# Patient Record
Sex: FEMALE | Race: WHITE | NOT HISPANIC OR LATINO | URBAN - METROPOLITAN AREA
[De-identification: names, ages, dates, MRNs, and addresses within clinical notes are randomized per-mention and may not be internally consistent; named-entity substitution may affect disease eponyms.]

---

## 2018-08-16 ENCOUNTER — INPATIENT (INPATIENT)
Facility: HOSPITAL | Age: 53
LOS: 0 days | Discharge: HOME | DRG: 301 | End: 2018-08-17
Attending: STUDENT IN AN ORGANIZED HEALTH CARE EDUCATION/TRAINING PROGRAM | Admitting: STUDENT IN AN ORGANIZED HEALTH CARE EDUCATION/TRAINING PROGRAM
Payer: COMMERCIAL

## 2018-08-16 ENCOUNTER — TRANSCRIPTION ENCOUNTER (OUTPATIENT)
Age: 53
End: 2018-08-16

## 2018-08-16 VITALS — TEMPERATURE: 98 F

## 2018-08-16 DIAGNOSIS — Z90.710 ACQUIRED ABSENCE OF BOTH CERVIX AND UTERUS: Chronic | ICD-10-CM

## 2018-08-16 DIAGNOSIS — Z90.49 ACQUIRED ABSENCE OF OTHER SPECIFIED PARTS OF DIGESTIVE TRACT: Chronic | ICD-10-CM

## 2018-08-16 DIAGNOSIS — Z98.891 HISTORY OF UTERINE SCAR FROM PREVIOUS SURGERY: Chronic | ICD-10-CM

## 2018-08-16 LAB
ANION GAP SERPL CALC-SCNC: 14 MMOL/L — SIGNIFICANT CHANGE UP (ref 5–17)
APTT BLD: 62.6 SEC — HIGH (ref 27.5–37.4)
BLD GP AB SCN SERPL QL: NEGATIVE — SIGNIFICANT CHANGE UP
BUN SERPL-MCNC: 15 MG/DL — SIGNIFICANT CHANGE UP (ref 7–23)
CALCIUM SERPL-MCNC: 9.8 MG/DL — SIGNIFICANT CHANGE UP (ref 8.4–10.5)
CHLORIDE SERPL-SCNC: 100 MMOL/L — SIGNIFICANT CHANGE UP (ref 96–108)
CO2 SERPL-SCNC: 25 MMOL/L — SIGNIFICANT CHANGE UP (ref 22–31)
CREAT SERPL-MCNC: 0.7 MG/DL — SIGNIFICANT CHANGE UP (ref 0.5–1.3)
GLUCOSE SERPL-MCNC: 95 MG/DL — SIGNIFICANT CHANGE UP (ref 70–99)
HCT VFR BLD CALC: 40.6 % — SIGNIFICANT CHANGE UP (ref 34.5–45)
HGB BLD-MCNC: 13.4 G/DL — SIGNIFICANT CHANGE UP (ref 11.5–15.5)
INR BLD: 1.04 — SIGNIFICANT CHANGE UP (ref 0.88–1.16)
MAGNESIUM SERPL-MCNC: 2 MG/DL — SIGNIFICANT CHANGE UP (ref 1.6–2.6)
MCHC RBC-ENTMCNC: 30.7 PG — SIGNIFICANT CHANGE UP (ref 27–34)
MCHC RBC-ENTMCNC: 33 G/DL — SIGNIFICANT CHANGE UP (ref 32–36)
MCV RBC AUTO: 92.9 FL — SIGNIFICANT CHANGE UP (ref 80–100)
PHOSPHATE SERPL-MCNC: 3.2 MG/DL — SIGNIFICANT CHANGE UP (ref 2.5–4.5)
PLATELET # BLD AUTO: 288 K/UL — SIGNIFICANT CHANGE UP (ref 150–400)
POTASSIUM SERPL-MCNC: 3.8 MMOL/L — SIGNIFICANT CHANGE UP (ref 3.5–5.3)
POTASSIUM SERPL-SCNC: 3.8 MMOL/L — SIGNIFICANT CHANGE UP (ref 3.5–5.3)
PROTHROM AB SERPL-ACNC: 11.5 SEC — SIGNIFICANT CHANGE UP (ref 9.8–12.7)
RBC # BLD: 4.37 M/UL — SIGNIFICANT CHANGE UP (ref 3.8–5.2)
RBC # FLD: 12 % — SIGNIFICANT CHANGE UP (ref 10.3–16.9)
RH IG SCN BLD-IMP: POSITIVE — SIGNIFICANT CHANGE UP
SODIUM SERPL-SCNC: 139 MMOL/L — SIGNIFICANT CHANGE UP (ref 135–145)
WBC # BLD: 6.4 K/UL — SIGNIFICANT CHANGE UP (ref 3.8–10.5)
WBC # FLD AUTO: 6.4 K/UL — SIGNIFICANT CHANGE UP (ref 3.8–10.5)

## 2018-08-16 PROCEDURE — 70498 CT ANGIOGRAPHY NECK: CPT | Mod: 26

## 2018-08-16 PROCEDURE — 70496 CT ANGIOGRAPHY HEAD: CPT | Mod: 26

## 2018-08-16 RX ORDER — BUPROPION HYDROCHLORIDE 150 MG/1
300 TABLET, EXTENDED RELEASE ORAL
Qty: 0 | Refills: 0 | COMMUNITY

## 2018-08-16 RX ORDER — ACETAMINOPHEN 500 MG
1000 TABLET ORAL EVERY 6 HOURS
Qty: 0 | Refills: 0 | Status: DISCONTINUED | OUTPATIENT
Start: 2018-08-16 | End: 2018-08-17

## 2018-08-16 RX ORDER — DESVENLAFAXINE 50 MG/1
1 TABLET, EXTENDED RELEASE ORAL
Qty: 0 | Refills: 0 | COMMUNITY

## 2018-08-16 RX ORDER — DESVENLAFAXINE 50 MG/1
100 TABLET, EXTENDED RELEASE ORAL DAILY
Qty: 0 | Refills: 0 | Status: DISCONTINUED | OUTPATIENT
Start: 2018-08-16 | End: 2018-08-17

## 2018-08-16 RX ORDER — LIOTHYRONINE SODIUM 25 UG/1
5 TABLET ORAL
Qty: 0 | Refills: 0 | COMMUNITY

## 2018-08-16 RX ORDER — SODIUM CHLORIDE 9 MG/ML
1000 INJECTION INTRAMUSCULAR; INTRAVENOUS; SUBCUTANEOUS
Qty: 0 | Refills: 0 | Status: DISCONTINUED | OUTPATIENT
Start: 2018-08-16 | End: 2018-08-17

## 2018-08-16 RX ORDER — LIOTHYRONINE SODIUM 25 UG/1
5 TABLET ORAL DAILY
Qty: 0 | Refills: 0 | Status: DISCONTINUED | OUTPATIENT
Start: 2018-08-17 | End: 2018-08-17

## 2018-08-16 RX ORDER — TRIAMTERENE/HYDROCHLOROTHIAZID 75 MG-50MG
1 TABLET ORAL
Qty: 0 | Refills: 0 | COMMUNITY

## 2018-08-16 RX ORDER — LISINOPRIL 2.5 MG/1
1 TABLET ORAL
Qty: 0 | Refills: 0 | COMMUNITY

## 2018-08-16 RX ORDER — LEVOTHYROXINE SODIUM 125 MCG
100 TABLET ORAL DAILY
Qty: 0 | Refills: 0 | Status: DISCONTINUED | OUTPATIENT
Start: 2018-08-17 | End: 2018-08-17

## 2018-08-16 RX ORDER — ACETAMINOPHEN 500 MG
325 TABLET ORAL EVERY 4 HOURS
Qty: 0 | Refills: 0 | Status: DISCONTINUED | OUTPATIENT
Start: 2018-08-16 | End: 2018-08-16

## 2018-08-16 RX ORDER — TRIAMTERENE/HYDROCHLOROTHIAZID 75 MG-50MG
1 TABLET ORAL DAILY
Qty: 0 | Refills: 0 | Status: DISCONTINUED | OUTPATIENT
Start: 2018-08-17 | End: 2018-08-17

## 2018-08-16 RX ORDER — BUPROPION HYDROCHLORIDE 150 MG/1
300 TABLET, EXTENDED RELEASE ORAL DAILY
Qty: 0 | Refills: 0 | Status: DISCONTINUED | OUTPATIENT
Start: 2018-08-17 | End: 2018-08-17

## 2018-08-16 RX ORDER — HEPARIN SODIUM 5000 [USP'U]/ML
500 INJECTION INTRAVENOUS; SUBCUTANEOUS
Qty: 25000 | Refills: 0 | Status: DISCONTINUED | OUTPATIENT
Start: 2018-08-16 | End: 2018-08-17

## 2018-08-16 RX ORDER — LEVOTHYROXINE SODIUM 125 MCG
1 TABLET ORAL
Qty: 0 | Refills: 0 | COMMUNITY

## 2018-08-16 RX ADMIN — HEPARIN SODIUM 5 UNIT(S)/HR: 5000 INJECTION INTRAVENOUS; SUBCUTANEOUS at 20:52

## 2018-08-16 RX ADMIN — Medication 1000 MILLIGRAM(S): at 22:58

## 2018-08-16 NOTE — H&P ADULT - ASSESSMENT
53 yo F with right carotid artery dissection  -Admit to telemetry  -Heparin gtt with goal PTT 60-80  -STAT CTA head and neck  -Home meds  -NPO p mn/IVF  -AM labs      Patient seen and examined at bedside with attending Dr. Oliveros.

## 2018-08-16 NOTE — H&P ADULT - HISTORY OF PRESENT ILLNESS
53 yo F with PMH of Meniere's disease (diagnosed 1 year ago) with chronic neck pain and headaches presents as a trasnfer from [facility] with right carotid dissection. Patient states she woke up on 8/15/18 with pressure like headache that was consistent with prior regular headaches she has been having for the past year since she got diagnosed with Meniere's disease. She took Motrin which relieved her symptoms. Later on that day at work (works as a nurse at [employer]) she took her BP which was elevated to "140-150's systolic) which patient states was above her baseline. After work she was driving home to [place] and developed left arm numbness/tingling along with "heaviness", without loss of function. At that time she presented to [facility] ER. Her arm symptoms remained for 4 hours and then resolved spontaneously. On workup an MRI was performed and showed dissection of cervical segment of the right carotid artery. Since patient has her doctors at [employer] she requested and was transferred to [employer].  Denies any vision changes, amaurosis fugax, leg numbness/tingling/weakness, denies previous episodes of upper or lower extremity numbness/tingling/weakness.

## 2018-08-16 NOTE — PATIENT PROFILE ADULT. - NS TRANSFER PATIENT BELONGINGS
Clothing/Cell Phone/PDA (specify) ipad, headphones, , pocketbook/Clothing/Other belongings/Electronic Device (specify)/Cell Phone/PDA (specify)

## 2018-08-16 NOTE — H&P ADULT - NSHPPHYSICALEXAM_GEN_ALL_CORE
Gen: AAOx3, NAD  Neuro: face symmetric, equal strength bilat 5/5 of all ext, no motor/sensory deficits  Pulm: no respiratory distress  CV: RRR S1 S2  Abd: soft, non tender, non distended  LE: warm, well perfused, no edema

## 2018-08-16 NOTE — H&P ADULT - PMH
Carotid artery dissection  RIGHT  Depressive disorder  Depression  Diverticulitis of colon  Diverticulitis  Hypertension    Hypothyroidism  Hypothyroidism  Menieres disease

## 2018-08-17 ENCOUNTER — TRANSCRIPTION ENCOUNTER (OUTPATIENT)
Age: 53
End: 2018-08-17

## 2018-08-17 VITALS
DIASTOLIC BLOOD PRESSURE: 65 MMHG | HEART RATE: 68 BPM | SYSTOLIC BLOOD PRESSURE: 118 MMHG | RESPIRATION RATE: 16 BRPM | OXYGEN SATURATION: 95 %

## 2018-08-17 DIAGNOSIS — I82.409 ACUTE EMBOLISM AND THROMBOSIS OF UNSPECIFIED DEEP VEINS OF UNSPECIFIED LOWER EXTREMITY: ICD-10-CM

## 2018-08-17 LAB
ANION GAP SERPL CALC-SCNC: 11 MMOL/L — SIGNIFICANT CHANGE UP (ref 5–17)
APTT BLD: 174 SEC — CRITICAL HIGH (ref 27.5–37.4)
APTT BLD: 37.5 SEC — HIGH (ref 27.5–37.4)
BUN SERPL-MCNC: 16 MG/DL — SIGNIFICANT CHANGE UP (ref 7–23)
CALCIUM SERPL-MCNC: 9.5 MG/DL — SIGNIFICANT CHANGE UP (ref 8.4–10.5)
CHLORIDE SERPL-SCNC: 102 MMOL/L — SIGNIFICANT CHANGE UP (ref 96–108)
CO2 SERPL-SCNC: 24 MMOL/L — SIGNIFICANT CHANGE UP (ref 22–31)
CREAT SERPL-MCNC: 0.82 MG/DL — SIGNIFICANT CHANGE UP (ref 0.5–1.3)
GLUCOSE SERPL-MCNC: 111 MG/DL — HIGH (ref 70–99)
HCT VFR BLD CALC: 39.4 % — SIGNIFICANT CHANGE UP (ref 34.5–45)
HGB BLD-MCNC: 12.6 G/DL — SIGNIFICANT CHANGE UP (ref 11.5–15.5)
INR BLD: 1.07 — SIGNIFICANT CHANGE UP (ref 0.88–1.16)
MAGNESIUM SERPL-MCNC: 2.1 MG/DL — SIGNIFICANT CHANGE UP (ref 1.6–2.6)
MCHC RBC-ENTMCNC: 30.7 PG — SIGNIFICANT CHANGE UP (ref 27–34)
MCHC RBC-ENTMCNC: 32 G/DL — SIGNIFICANT CHANGE UP (ref 32–36)
MCV RBC AUTO: 95.9 FL — SIGNIFICANT CHANGE UP (ref 80–100)
PHOSPHATE SERPL-MCNC: 3.9 MG/DL — SIGNIFICANT CHANGE UP (ref 2.5–4.5)
PLATELET # BLD AUTO: 243 K/UL — SIGNIFICANT CHANGE UP (ref 150–400)
POTASSIUM SERPL-MCNC: 3.6 MMOL/L — SIGNIFICANT CHANGE UP (ref 3.5–5.3)
POTASSIUM SERPL-SCNC: 3.6 MMOL/L — SIGNIFICANT CHANGE UP (ref 3.5–5.3)
PROTHROM AB SERPL-ACNC: 11.5 SEC — SIGNIFICANT CHANGE UP (ref 9.8–12.7)
RBC # BLD: 4.11 M/UL — SIGNIFICANT CHANGE UP (ref 3.8–5.2)
RBC # FLD: 12.5 % — SIGNIFICANT CHANGE UP (ref 10.3–16.9)
SODIUM SERPL-SCNC: 137 MMOL/L — SIGNIFICANT CHANGE UP (ref 135–145)
WBC # BLD: 4.8 K/UL — SIGNIFICANT CHANGE UP (ref 3.8–10.5)
WBC # FLD AUTO: 4.8 K/UL — SIGNIFICANT CHANGE UP (ref 3.8–10.5)

## 2018-08-17 PROCEDURE — 86901 BLOOD TYPING SEROLOGIC RH(D): CPT

## 2018-08-17 PROCEDURE — 80048 BASIC METABOLIC PNL TOTAL CA: CPT

## 2018-08-17 PROCEDURE — 86900 BLOOD TYPING SEROLOGIC ABO: CPT

## 2018-08-17 PROCEDURE — 70496 CT ANGIOGRAPHY HEAD: CPT

## 2018-08-17 PROCEDURE — 86850 RBC ANTIBODY SCREEN: CPT

## 2018-08-17 PROCEDURE — 83735 ASSAY OF MAGNESIUM: CPT

## 2018-08-17 PROCEDURE — 85610 PROTHROMBIN TIME: CPT

## 2018-08-17 PROCEDURE — 84100 ASSAY OF PHOSPHORUS: CPT

## 2018-08-17 PROCEDURE — 70498 CT ANGIOGRAPHY NECK: CPT

## 2018-08-17 PROCEDURE — 85730 THROMBOPLASTIN TIME PARTIAL: CPT

## 2018-08-17 PROCEDURE — 36415 COLL VENOUS BLD VENIPUNCTURE: CPT

## 2018-08-17 PROCEDURE — 85027 COMPLETE CBC AUTOMATED: CPT

## 2018-08-17 RX ORDER — RIVAROXABAN 15 MG-20MG
1 KIT ORAL
Qty: 1 | Refills: 0 | OUTPATIENT
Start: 2018-08-17

## 2018-08-17 RX ORDER — POTASSIUM CHLORIDE 20 MEQ
40 PACKET (EA) ORAL ONCE
Qty: 0 | Refills: 0 | Status: COMPLETED | OUTPATIENT
Start: 2018-08-17 | End: 2018-08-17

## 2018-08-17 RX ORDER — RIVAROXABAN 15 MG-20MG
15 KIT ORAL ONCE
Qty: 0 | Refills: 0 | Status: COMPLETED | OUTPATIENT
Start: 2018-08-17 | End: 2018-08-17

## 2018-08-17 RX ORDER — RIVAROXABAN 15 MG/1
15 TABLET, FILM COATED ORAL
Qty: 2 | Refills: 0 | Status: COMPLETED | COMMUNITY
Start: 2018-08-17 | End: 2018-08-18

## 2018-08-17 RX ORDER — RIVAROXABAN 15 MG/1
15 TABLET, FILM COATED ORAL
Qty: 42 | Refills: 0 | Status: COMPLETED | COMMUNITY
Start: 2018-08-17 | End: 2018-09-07

## 2018-08-17 RX ORDER — HEPARIN SODIUM 5000 [USP'U]/ML
1500 INJECTION INTRAVENOUS; SUBCUTANEOUS
Qty: 25000 | Refills: 0 | Status: DISCONTINUED | OUTPATIENT
Start: 2018-08-17 | End: 2018-08-17

## 2018-08-17 RX ORDER — RIVAROXABAN 15 MG-20MG
15 KIT ORAL
Qty: 0 | Refills: 0 | Status: DISCONTINUED | OUTPATIENT
Start: 2018-08-17 | End: 2018-08-17

## 2018-08-17 RX ORDER — RIVAROXABAN 15 MG/1
15 TABLET, FILM COATED ORAL
Qty: 4 | Refills: 0 | Status: DISCONTINUED | COMMUNITY
Start: 2018-08-17 | End: 2018-08-17

## 2018-08-17 RX ORDER — RIVAROXABAN 15 MG/1
15 TABLET, FILM COATED ORAL
Qty: 42 | Refills: 0 | Status: DISCONTINUED | COMMUNITY
Start: 2018-08-17 | End: 2018-08-17

## 2018-08-17 RX ADMIN — LIOTHYRONINE SODIUM 5 MICROGRAM(S): 25 TABLET ORAL at 05:36

## 2018-08-17 RX ADMIN — DESVENLAFAXINE 100 MILLIGRAM(S): 50 TABLET, EXTENDED RELEASE ORAL at 11:25

## 2018-08-17 RX ADMIN — Medication 40 MILLIEQUIVALENT(S): at 11:25

## 2018-08-17 RX ADMIN — RIVAROXABAN 15 MILLIGRAM(S): KIT at 11:25

## 2018-08-17 RX ADMIN — BUPROPION HYDROCHLORIDE 300 MILLIGRAM(S): 150 TABLET, EXTENDED RELEASE ORAL at 11:25

## 2018-08-17 RX ADMIN — SODIUM CHLORIDE 80 MILLILITER(S): 9 INJECTION INTRAMUSCULAR; INTRAVENOUS; SUBCUTANEOUS at 00:00

## 2018-08-17 RX ADMIN — Medication 1000 MILLIGRAM(S): at 00:00

## 2018-08-17 RX ADMIN — Medication 1000 MILLIGRAM(S): at 06:30

## 2018-08-17 RX ADMIN — Medication 100 MICROGRAM(S): at 05:36

## 2018-08-17 RX ADMIN — HEPARIN SODIUM 15 UNIT(S)/HR: 5000 INJECTION INTRAVENOUS; SUBCUTANEOUS at 02:43

## 2018-08-17 RX ADMIN — Medication 1 TABLET(S): at 05:36

## 2018-08-17 RX ADMIN — Medication 1000 MILLIGRAM(S): at 05:36

## 2018-08-17 NOTE — PROGRESS NOTE ADULT - SUBJECTIVE AND OBJECTIVE BOX
O/N: ELENO, VSS, CTA completed                                53 yo F with right carotid artery dissection  -Admit to telemetry  -Heparin gtt with goal PTT 60-80  -STAT CTA head and neck  -Home meds  -NPO p mn/IVF  -AM labs SUBJECTIVE:   No acute events overnight. Patient feels better and denies any current symptoms.    ---------------------------------------------------------------    Vital Signs Last 24 Hrs  T(C): 35.9 (17 Aug 2018 09:05), Max: 36.6 (16 Aug 2018 21:47)  T(F): 96.7 (17 Aug 2018 09:05), Max: 97.8 (16 Aug 2018 21:47)  HR: 92 (17 Aug 2018 05:39) (72 - 99)  BP: 119/52 (17 Aug 2018 05:39) (114/71 - 136/91)  BP(mean): --  RR: 16 (17 Aug 2018 05:39) (16 - 20)  SpO2: 98% (17 Aug 2018 05:39) (97% - 98%)    I&O's Summary    16 Aug 2018 07:01  -  17 Aug 2018 07:00  --------------------------------------------------------  IN: 735 mL / OUT: 0 mL / NET: 735 mL    17 Aug 2018 07:01  -  17 Aug 2018 10:45  --------------------------------------------------------  IN: 0 mL / OUT: 0 mL / NET: 0 mL        ----------------------------------------------------------------    Physical Exam:  Gen: AAOx3, No apparent distress resting comfortably in bed  Neuro: face symmetric, equal strength 5/5 of b/l extremities, motor/sensory exam intact  Pulm: no respiratory distress on room air  CV: RRR S1 S2  Abd: soft, non tender, non distended  LE: warm, well perfused, no edema    -------------------------------------------------------------------    LABS:                        12.6   4.8   )-----------( 243      ( 17 Aug 2018 07:39 )             39.4     08-17    137  |  102  |  16  ----------------------------<  111<H>  3.6   |  24  |  0.82    Ca    9.5      17 Aug 2018 07:39  Phos  3.9     08-17  Mg     2.1     08-17      PT/INR - ( 17 Aug 2018 07:39 )   PT: 11.5 sec;   INR: 1.07          PTT - ( 17 Aug 2018 07:39 )  PTT:174.0 sec      RADIOLOGY & ADDITIONAL STUDIES:

## 2018-08-17 NOTE — DISCHARGE NOTE ADULT - PROVIDER TOKENS
FREE:[LAST:[Arlin],FIRST:[Emmanuel],PHONE:[phone]],FAX:[phone]],ADDRESS:[[address] [place] [address]]]

## 2018-08-17 NOTE — DISCHARGE NOTE ADULT - CARE PLAN
Principal Discharge DX:	Carotid artery dissection  Goal:	recovery  Assessment and plan of treatment:	Follow up with Dr. Oliveros in 1-2 weeks. Call the office at [phone] to schedule your appointment. Avoid strenuous exercises/contact sports prior to your appointment with him. Monitor for any signs of bleeding including changes in color of your stool, excessive bruising, bleeding from mucous membranes, etc.

## 2018-08-17 NOTE — DISCHARGE NOTE ADULT - PLAN OF CARE
recovery Follow up with Dr. Oliveros in 1-2 weeks. Call the office at [phone] to schedule your appointment. Avoid strenuous exercises/contact sports prior to your appointment with him. Monitor for any signs of bleeding including changes in color of your stool, excessive bruising, bleeding from mucous membranes, etc.

## 2018-08-17 NOTE — PROGRESS NOTE ADULT - ASSESSMENT
Pt is a 53 yo F w PMHx who presented to [facility] with complaints of headache and LUE weakness. She underwent CTA which revealed a right carotid artery dissection and pt was transferred to [facility]. Imaging revealed that the dissection is stable without evidence of acute thrombus formation. Pt's symptoms have resolved at this point.    -d/c heparin drip  -start xarelto: first dose here  -continue home meds  -outpt f/u w Dr. Oliveros  -d/c to home

## 2018-08-17 NOTE — DISCHARGE NOTE ADULT - PATIENT PORTAL LINK FT
You can access the [facility] Patient Portal, offered by [facility], by registering with the following website: [email or web address]

## 2018-08-17 NOTE — DISCHARGE NOTE ADULT - HOSPITAL COURSE
The patient is a 51 yo F w PMHx of Meniere's disease who presented to [facility] with LUE weakness and headache with subsequent CTA revealing R carotid artery dissection. The patient was then transferred to [facility] for vascular surgical evaluation. She was admitted to the vascular surgery team and started on anticoagulation. After further evaluation, the patient was deemed stable for discharge with continued anticoagulation therapy with Xarelto. The patient was counseled on the bleeding risks of anticoagulation therapy. The patient received the first dose of Xarelto in the hospital with a prescription sent to her pharmacy. She was deemed stable for discharge on 8/17/18.

## 2018-08-17 NOTE — DISCHARGE NOTE ADULT - MEDICATION SUMMARY - MEDICATIONS TO TAKE
I will START or STAY ON the medications listed below when I get home from the hospital:    lisinopril 10 mg oral tablet  -- 1 tab(s) by mouth once a day  -- Indication: For Home med    Xarelto Starter Pack 15 mg-20 mg oral kit  -- 1 kit by mouth now     Please take as instructed following directions in pack  -- Check with your doctor before becoming pregnant.  It is very important that you take or use this exactly as directed.  Do not skip doses or discontinue unless directed by your doctor.  Obtain medical advice before taking any non-prescription drugs as some may affect the action of this medication.  Take with food.    -- Indication: For Carotid artery dissection    Pristiq 100 mg oral tablet, extended release  -- 1 tab(s) by mouth once a day  -- Indication: For Home med    Dyazide  -- 1 tab(s) by mouth once a day  -- Indication: For Home med    Wellbutrin XL  -- 300 milligram(s) by mouth once a day  -- Indication: For Home med    Synthroid 100 mcg (0.1 mg) oral tablet  -- 1 tab(s) by mouth once a day  -- Indication: For Home med    Cytomel  -- 5 microgram(s) by mouth once a day  -- Indication: For Home med

## 2018-08-19 ENCOUNTER — TRANSCRIPTION ENCOUNTER (OUTPATIENT)
Age: 53
End: 2018-08-19

## 2018-08-20 PROBLEM — H81.09 MENIERE'S DISEASE, UNSPECIFIED EAR: Chronic | Status: ACTIVE | Noted: 2018-08-16

## 2018-08-20 PROBLEM — I77.71 DISSECTION OF CAROTID ARTERY: Chronic | Status: ACTIVE | Noted: 2018-08-16

## 2018-08-20 PROBLEM — I10 ESSENTIAL (PRIMARY) HYPERTENSION: Chronic | Status: ACTIVE | Noted: 2018-08-16

## 2018-08-22 DIAGNOSIS — I77.71 DISSECTION OF CAROTID ARTERY: ICD-10-CM

## 2018-08-22 DIAGNOSIS — E03.9 HYPOTHYROIDISM, UNSPECIFIED: ICD-10-CM

## 2018-08-22 DIAGNOSIS — F32.9 MAJOR DEPRESSIVE DISORDER, SINGLE EPISODE, UNSPECIFIED: ICD-10-CM

## 2018-08-22 DIAGNOSIS — I10 ESSENTIAL (PRIMARY) HYPERTENSION: ICD-10-CM

## 2018-08-30 ENCOUNTER — APPOINTMENT (OUTPATIENT)
Dept: VASCULAR SURGERY | Facility: CLINIC | Age: 53
End: 2018-08-30
Payer: COMMERCIAL

## 2018-08-30 VITALS
DIASTOLIC BLOOD PRESSURE: 73 MMHG | OXYGEN SATURATION: 97 % | HEART RATE: 72 BPM | HEIGHT: 69 IN | SYSTOLIC BLOOD PRESSURE: 119 MMHG | WEIGHT: 180 LBS | BODY MASS INDEX: 26.66 KG/M2

## 2018-08-30 DIAGNOSIS — I10 ESSENTIAL (PRIMARY) HYPERTENSION: ICD-10-CM

## 2018-08-30 DIAGNOSIS — I51.9 HEART DISEASE, UNSPECIFIED: ICD-10-CM

## 2018-08-30 PROCEDURE — 99203 OFFICE O/P NEW LOW 30 MIN: CPT

## 2018-08-30 PROCEDURE — 93880 EXTRACRANIAL BILAT STUDY: CPT

## 2018-08-30 RX ORDER — HYDROCHLOROTHIAZIDE AND TRIAMTERENE 25; 37.5 MG/1; MG/1
37.5-25 CAPSULE ORAL
Refills: 0 | Status: ACTIVE | COMMUNITY

## 2018-09-10 ENCOUNTER — TRANSCRIPTION ENCOUNTER (OUTPATIENT)
Age: 53
End: 2018-09-10

## 2018-11-08 ENCOUNTER — APPOINTMENT (OUTPATIENT)
Dept: CT IMAGING | Facility: HOSPITAL | Age: 53
End: 2018-11-08

## 2018-11-08 ENCOUNTER — OUTPATIENT (OUTPATIENT)
Dept: OUTPATIENT SERVICES | Facility: HOSPITAL | Age: 53
LOS: 1 days | End: 2018-11-08
Payer: COMMERCIAL

## 2018-11-08 DIAGNOSIS — Z98.891 HISTORY OF UTERINE SCAR FROM PREVIOUS SURGERY: Chronic | ICD-10-CM

## 2018-11-08 DIAGNOSIS — Z90.710 ACQUIRED ABSENCE OF BOTH CERVIX AND UTERUS: Chronic | ICD-10-CM

## 2018-11-08 DIAGNOSIS — Z90.49 ACQUIRED ABSENCE OF OTHER SPECIFIED PARTS OF DIGESTIVE TRACT: Chronic | ICD-10-CM

## 2018-11-08 PROCEDURE — 70498 CT ANGIOGRAPHY NECK: CPT

## 2018-11-08 PROCEDURE — 70498 CT ANGIOGRAPHY NECK: CPT | Mod: 26

## 2018-11-29 ENCOUNTER — APPOINTMENT (OUTPATIENT)
Dept: VASCULAR SURGERY | Facility: CLINIC | Age: 53
End: 2018-11-29
Payer: COMMERCIAL

## 2018-11-29 VITALS
HEART RATE: 80 BPM | SYSTOLIC BLOOD PRESSURE: 151 MMHG | RESPIRATION RATE: 14 BRPM | DIASTOLIC BLOOD PRESSURE: 119 MMHG | OXYGEN SATURATION: 98 %

## 2018-11-29 PROCEDURE — 99214 OFFICE O/P EST MOD 30 MIN: CPT

## 2018-11-29 PROCEDURE — 93882 EXTRACRANIAL UNI/LTD STUDY: CPT

## 2018-12-19 ENCOUNTER — APPOINTMENT (OUTPATIENT)
Dept: NEUROSURGERY | Facility: CLINIC | Age: 53
End: 2018-12-19
Payer: COMMERCIAL

## 2018-12-19 VITALS
BODY MASS INDEX: 27.4 KG/M2 | HEIGHT: 69 IN | DIASTOLIC BLOOD PRESSURE: 90 MMHG | WEIGHT: 185 LBS | SYSTOLIC BLOOD PRESSURE: 123 MMHG | OXYGEN SATURATION: 96 % | HEART RATE: 77 BPM | RESPIRATION RATE: 16 BRPM | TEMPERATURE: 98.6 F

## 2018-12-19 DIAGNOSIS — E72.12 METHYLENETETRAHYDROFOLATE REDUCTASE DEFICIENCY: ICD-10-CM

## 2018-12-19 DIAGNOSIS — Z81.8 FAMILY HISTORY OF OTHER MENTAL AND BEHAVIORAL DISORDERS: ICD-10-CM

## 2018-12-19 DIAGNOSIS — D68.0 VON WILLEBRAND'S DISEASE: ICD-10-CM

## 2018-12-19 DIAGNOSIS — Z80.0 FAMILY HISTORY OF MALIGNANT NEOPLASM OF DIGESTIVE ORGANS: ICD-10-CM

## 2018-12-19 DIAGNOSIS — R51 HEADACHE: ICD-10-CM

## 2018-12-19 DIAGNOSIS — Z87.19 PERSONAL HISTORY OF OTHER DISEASES OF THE DIGESTIVE SYSTEM: ICD-10-CM

## 2018-12-19 DIAGNOSIS — Z85.118 PERSONAL HISTORY OF OTHER MALIGNANT NEOPLASM OF BRONCHUS AND LUNG: ICD-10-CM

## 2018-12-19 DIAGNOSIS — I47.1 SUPRAVENTRICULAR TACHYCARDIA: ICD-10-CM

## 2018-12-19 DIAGNOSIS — Z80.1 FAMILY HISTORY OF MALIGNANT NEOPLASM OF TRACHEA, BRONCHUS AND LUNG: ICD-10-CM

## 2018-12-19 DIAGNOSIS — Z83.2 FAMILY HISTORY OF DISEASES OF THE BLOOD AND BLOOD-FORMING ORGANS AND CERTAIN DISORDERS INVOLVING THE IMMUNE MECHANISM: ICD-10-CM

## 2018-12-19 DIAGNOSIS — Z82.49 FAMILY HISTORY OF ISCHEMIC HEART DISEASE AND OTHER DISEASES OF THE CIRCULATORY SYSTEM: ICD-10-CM

## 2018-12-19 DIAGNOSIS — Z87.891 PERSONAL HISTORY OF NICOTINE DEPENDENCE: ICD-10-CM

## 2018-12-19 DIAGNOSIS — Z80.41 FAMILY HISTORY OF MALIGNANT NEOPLASM OF OVARY: ICD-10-CM

## 2018-12-19 DIAGNOSIS — Z80.6 FAMILY HISTORY OF LEUKEMIA: ICD-10-CM

## 2018-12-19 DIAGNOSIS — H81.09 MENIERE'S DISEASE, UNSPECIFIED EAR: ICD-10-CM

## 2018-12-19 DIAGNOSIS — Z87.81 PERSONAL HISTORY OF (HEALED) TRAUMATIC FRACTURE: ICD-10-CM

## 2018-12-19 DIAGNOSIS — Z82.0 FAMILY HISTORY OF EPILEPSY AND OTHER DISEASES OF THE NERVOUS SYSTEM: ICD-10-CM

## 2018-12-19 DIAGNOSIS — F32.9 MAJOR DEPRESSIVE DISORDER, SINGLE EPISODE, UNSPECIFIED: ICD-10-CM

## 2018-12-19 DIAGNOSIS — Z86.19 PERSONAL HISTORY OF OTHER INFECTIOUS AND PARASITIC DISEASES: ICD-10-CM

## 2018-12-19 PROCEDURE — 99205 OFFICE O/P NEW HI 60 MIN: CPT

## 2018-12-19 RX ORDER — LISINOPRIL 10 MG/1
10 TABLET ORAL
Refills: 0 | Status: DISCONTINUED | COMMUNITY
End: 2018-12-19

## 2018-12-19 RX ORDER — LIOTHYRONINE SODIUM 5 UG/1
5 TABLET ORAL
Refills: 0 | Status: DISCONTINUED | COMMUNITY
End: 2018-12-19

## 2018-12-20 ENCOUNTER — LABORATORY RESULT (OUTPATIENT)
Age: 53
End: 2018-12-20

## 2018-12-20 ENCOUNTER — APPOINTMENT (OUTPATIENT)
Dept: NEUROLOGY | Facility: CLINIC | Age: 53
End: 2018-12-20

## 2019-01-04 ENCOUNTER — APPOINTMENT (OUTPATIENT)
Dept: HEART AND VASCULAR | Facility: CLINIC | Age: 54
End: 2019-01-04
Payer: COMMERCIAL

## 2019-01-04 ENCOUNTER — NON-APPOINTMENT (OUTPATIENT)
Age: 54
End: 2019-01-04

## 2019-01-04 VITALS
DIASTOLIC BLOOD PRESSURE: 90 MMHG | WEIGHT: 180 LBS | HEIGHT: 69 IN | HEART RATE: 64 BPM | SYSTOLIC BLOOD PRESSURE: 133 MMHG | BODY MASS INDEX: 26.66 KG/M2 | RESPIRATION RATE: 20 BRPM

## 2019-01-04 PROCEDURE — 99205 OFFICE O/P NEW HI 60 MIN: CPT

## 2019-01-04 PROCEDURE — 93000 ELECTROCARDIOGRAM COMPLETE: CPT

## 2019-01-04 RX ORDER — PROCHLORPERAZINE MALEATE 10 MG/1
10 TABLET ORAL
Qty: 30 | Refills: 0 | Status: DISCONTINUED | COMMUNITY
Start: 2018-12-19 | End: 2019-01-04

## 2019-01-04 RX ORDER — LEVOTHYROXINE SODIUM 100 UG/1
100 TABLET ORAL DAILY
Qty: 90 | Refills: 3 | Status: ACTIVE | COMMUNITY

## 2019-01-04 RX ORDER — LOSARTAN POTASSIUM 50 MG/1
50 TABLET, FILM COATED ORAL DAILY
Qty: 30 | Refills: 5 | Status: ACTIVE | COMMUNITY
Start: 2019-01-04 | End: 1900-01-01

## 2019-01-04 RX ORDER — METOPROLOL TARTRATE 50 MG/1
50 TABLET, FILM COATED ORAL
Refills: 0 | Status: DISCONTINUED | COMMUNITY
End: 2019-01-04

## 2019-01-04 NOTE — REASON FOR VISIT
[Initial Evaluation] : an initial evaluation of [FreeTextEntry1] : Ms. Treadwell is a 53 y.o. F with pmhx significant for diverticulitis, s/p colon resection, L Lung CA, s/p resection in 2015, Menieres disease, pseudoaneurysms of R ICA with dissection (no sx intervention), HTN, depression and chronic headaches, who presents for palpitations, chest burning, chest discomfort, sob during minimal activity and sometimes at rest. These symptoms occur daily, especially the feelings of tachycardia and sob.  These symptoms began in August 2018, at around the time of the BARON dissection, and have persisted since then.  She has been taking beta blocker with little effect.\par \par She went to [facility] in September 2018 with chest discomfort. She had a nuclear stress test.  \par \par Pt given a LTM by Dr. Leonarda Matthews 10/16/18 - 11/15/2018 showing SR, ST, PVCs, and brief PAT. SOB episodes generally correspond with ST up to 120bpm.  \par \par Nuclear stress test 9/9/18 with EKG showing SR with RBBB, no changes during exercise, normal perfusion, LV EF 77%. Normal SPECT perfusion scans.

## 2019-01-04 NOTE — PHYSICAL EXAM
[General Appearance - Well Developed] : well developed [Normal Appearance] : normal appearance [Well Groomed] : well groomed [General Appearance - Well Nourished] : well nourished [No Deformities] : no deformities [General Appearance - In No Acute Distress] : no acute distress [Normal Conjunctiva] : the conjunctiva exhibited no abnormalities [Eyelids - No Xanthelasma] : the eyelids demonstrated no xanthelasmas [Normal Oral Mucosa] : normal oral mucosa [No Oral Pallor] : no oral pallor [No Oral Cyanosis] : no oral cyanosis [Normal Jugular Venous A Waves Present] : normal jugular venous A waves present [Normal Jugular Venous V Waves Present] : normal jugular venous V waves present [No Jugular Venous Salguero A Waves] : no jugular venous salguero A waves [Heart Rate And Rhythm] : heart rate and rhythm were normal [Heart Sounds] : normal S1 and S2 [Murmurs] : no murmurs present [Respiration, Rhythm And Depth] : normal respiratory rhythm and effort [Exaggerated Use Of Accessory Muscles For Inspiration] : no accessory muscle use [Auscultation Breath Sounds / Voice Sounds] : lungs were clear to auscultation bilaterally [Abdomen Soft] : soft [Abdomen Tenderness] : non-tender [Abdomen Mass (___ Cm)] : no abdominal mass palpated [Nail Clubbing] : no clubbing of the fingernails [Cyanosis, Localized] : no localized cyanosis [Petechial Hemorrhages (___cm)] : no petechial hemorrhages [Skin Color & Pigmentation] : normal skin color and pigmentation [] : no rash [No Venous Stasis] : no venous stasis [Skin Lesions] : no skin lesions [No Skin Ulcers] : no skin ulcer [No Xanthoma] : no  xanthoma was observed [Oriented To Time, Place, And Person] : oriented to person, place, and time [Affect] : the affect was normal [Mood] : the mood was normal [No Anxiety] : not feeling anxious

## 2019-01-04 NOTE — REVIEW OF SYSTEMS
[Headache] : headache [Negative] : Heme/Lymph [Fever] : no fever [Chills] : no chills [Feeling Fatigued] : not feeling fatigued [Shortness Of Breath] : no shortness of breath [Dyspnea on exertion] : not dyspnea during exertion [Chest  Pressure] : no chest pressure [Chest Pain] : no chest pain [Lower Ext Edema] : no extremity edema [Palpitations] : no palpitations [Abdominal Pain] : no abdominal pain [Nausea] : no nausea [Heartburn] : no heartburn [Change in Appetite] : no change in appetite [Dysphagia] : no dysphagia

## 2019-02-05 ENCOUNTER — APPOINTMENT (OUTPATIENT)
Dept: NEUROLOGY | Facility: CLINIC | Age: 54
End: 2019-02-05
Payer: COMMERCIAL

## 2019-02-05 VITALS
SYSTOLIC BLOOD PRESSURE: 133 MMHG | WEIGHT: 190 LBS | OXYGEN SATURATION: 98 % | BODY MASS INDEX: 28.14 KG/M2 | HEIGHT: 69 IN | DIASTOLIC BLOOD PRESSURE: 96 MMHG | HEART RATE: 83 BPM

## 2019-02-05 DIAGNOSIS — G95.9 DISEASE OF SPINAL CORD, UNSPECIFIED: ICD-10-CM

## 2019-02-05 PROCEDURE — 99205 OFFICE O/P NEW HI 60 MIN: CPT

## 2019-02-05 NOTE — HISTORY OF PRESENT ILLNESS
[FreeTextEntry1] : 53 year old F  with hx of R ICA pseudoaneurysms with dissection on medical management, diverticulitis s/p colon resection, L Lung CA s/p resection in 2015, Menieres disease presents for evaluation of chronic migraines.\par \par Pt reports that she started having HA's in august 2017.  Has a hx of intermittent HA's throughout her life but hasn’t had severe HA's until 8/2017.  She reports coming off the return flight from [place], lied down feeling jetlagged, got up half hr later but felt off balance.  Went to the bedroom to lie down for a nap, woke up few hrs later with severe vomiting and inability to ambulate (required assistance from  to get to bathroom).  Called PCP who prescribed anti-vert, which help a little bit but the symptoms persisted for a week.  Saw an ENT questioned sinus issues, put on antibiotics which she didn’t take bec she has a hx of Cdiff.\par \par Following that event she would have intermittent episodes of N/V/imbalance, less severe than the first and lasting few days.   \par \par Pt isnt sure if the Ha's started that week or shortly after, located in variable locations but often at temple (either or both).  Can also involve the neck. also has pressure behind right eye and inside the R ear. Pressure or pounding in quality.  +nausea, no vomiting.  +photophobia, unclear if phonophobia.  No vision changes during the HA but reports being told she had nystagmus.  During the episodes of "imbalance, severe nausea as described above she would typically get the Ha's, however Ha's occurred in between as well.  When Bonilla's started would occur 2-3 times per month.   Pain progressively worsened in severity and frequency since then although the frequency has not changed.  Each HA can last up to couple of days.  Has had 2 episodes where the pain woke her up but not usually.  \par \par This past Aug 2018, she developed the typical HA but her L arm became numb.  Went to ED in [facility] , found to have R ICA dissection at craniocervical jxn with pseudoaneurysms on CTA neck .  Has been following with Dr. Palomino, initially placed on Xarelto and has since been switched to ASa and plavix. Follow up CTA 11/8 showing stable dissection and pseudoaneurysms.  Given a medrol dose pack for HA's but that didn’t work.   Plavix and ASA assays showed good response.\par \par Currently the HA's are still happening 3+ times a month, lasting few days to over a week and reports having 15 headache days a month .  Takes motrin or tylenol which she reports taking 1-2 times a day up to a week at a time. Also given fioricet but doesn’t take it.  \par \par Has a hx of HTN.  Has a HA diary and appears to have it even when BP is normal.  Often gets a weird smell before the HA that other people don’t notice, cannot describe the smell.  \par \par L arm tingling comes and goes.\par \par Also was diagnosed with a form of dysautonomia because she has had palpitations as well. \par \par CTA neck 11/2018:\par Stable dissection with pseudoaneurysm involving the right  internal carotid artery at the craniocervical junction.  Vertebral arteries small, not well visualized\par \par CTA H/N 8/2018:\par Both vertebral arteries in the left ICA are normal in the neck.  Intracranially, the left A1 segment is hypoplastic, as is the left P1  with fetal P2.\par \par Addtl imaging:\par MR C spine done as [place]:\par C6-C7: Mild disc thinning. Minimal disc bulging. Left disc osteophyte    complex with mild left-sided cord compression and compression of the left C7    nerve root. Finding is best demonstrated on sagittal image 8 series 3, axial    image 7 series 7.             \par C5-C6: Minimal disc bulge. Bilateral uncovertebral joint osteophytes    result in moderate foramen narrowing with bilateral C6 nerve root compression.    Disc bulge extends up to the spinal cord, but does not compress the spinal    cord.             \par C4-C5: Mild left facet osteoarthritis. Mild disc thinning. Right disc    osteophyte complex with marked compression of the right C5 nerve root    (sagittal images 12-13 series 3, axial image 12 series 7).             C3-C4: Mild left facet osteoarthritis. Minimal right uncovertebral joint    osteophytes.             \par C2-C3: Moderate left facet osteoarthritis results in moderate left foramen    narrowing. No bulge or herniation. \par \par Social Hx\par no cigs/alc/drugs\par previously worked as a nurse at [employer]\par \par FHx\par niece- migraines\par M aunt- brain aneurysm\par \par ROS- negative except as listed in HPI\par \par

## 2019-02-05 NOTE — DISCUSSION/SUMMARY
[FreeTextEntry1] : 53 year old F with hx R craniocervical ICA dissection in the setting of pseudoaneurysm, presents for new onset, persistent HA's for the last 1.5 years.  HA's with migraine quality, inc preceding olfactory hallucinations which likely represents a migraine aura.  Currently having about 15 HA days a month.  Although dissections often cause HA, chronic HA's 2/2 chronic dissection is atypical, although location of pain behind the R eye and ear is classic for pain 2/2 carotid dissection\par \par Also reports new onset R eye exotropia for same duration of time, without assoc diplopia. May represent acquired 3rd nerve palsy, from ischemia of the distal nerve 2/2 ICA dissection or 2/2 ischemia of proximal nerve from PCA perforators (has fetal PCA).  however no other sign of oculomotor palsy on exam.  Vertebral arteries on prior vessel imaging were normal.\par \par Exam is also notable for very brisk, myelopathic reflexes in the legs and L arm PND without overt weakness.  MR C spine done in Aug revealed degenerative changes with mild cord impingement at C7 and multilevel foraminal stenosis.  L arm PND and intermittent numbness may be 2/2 cervical disease or ischemia related to the R sided dissection.  \par \par Plan\par MR Brain wo con- will repeat, original one looked at , normal\par Referral to neurooptho for exotropia of R eye\par Topamax started for migraine prophylaxis - 25mg BID\par Advised to take OTC breakthrough medication (i.e. ibuprofen) up to 3-4 times per week max\par Will cont to monitor her exam, no additional C spine imaging at this time as pt does not have any new symptoms to suggest worsening cord compression\par Management of R ICA dissection as per Dr. Palomino\par \par F/U 2 months

## 2019-02-05 NOTE — PHYSICAL EXAM
[FreeTextEntry1] : Exam:\par Gen- awake, alert, NAD\par MS- oriented x3, able to follow commands, speech fluent and nondysarthric\par CN- PERRL, mild exotropia of R eye with incomplete burying of R eye on L gaze (denies diplopia, face symmetrical, few bts horizontal nystag on bilat end gaze, v1-3 intact, face symmetrical, tongue and uvula midline, shoulder shrug intact\par M- R,L (5,5)\par UE: Shoulder abduction (5,5)\par Elbow flexion (5,5)\par Elbow extension (5,5)\par Wrist flexion (5,5)\par Wrist extension (5,5)\par  (5,5)\par Finger spread (5,5)\par APB (5,5)\par + PND on the L\par LE:\par Hip flexion (5,5)\par Knee flexion (5,5)\par Knee extension (5,5)\par Dorsiflexion (5,5)\par Plantarflexion (5,5)\par Inversion (5,5)\par Eversion (5,5)\par EHL (5,5)\par Reflexes: R, L (2+, 2+)\par Biceps: (3+, 3+)\par Triceps (3+, 3+)\par BR (3+, 3+)\par Patellars (3+, 3+)\par AJ (4+, 4+)- 2-3 bts nystag on R foot \par Toes: R toe mute, L downgoing\par Sensory:\par LT, pinprick, proprioception intact bilat\par Coord- FTN intact bilat, finger tapping symmetrical, CATHIE symm, HTS intact\par Gait- narrow based, steady gait, able to tandem, heel and toe walk, sway with rhomberg\par \par \par

## 2019-02-20 ENCOUNTER — APPOINTMENT (OUTPATIENT)
Dept: OPHTHALMOLOGY | Facility: CLINIC | Age: 54
End: 2019-02-20
Payer: COMMERCIAL

## 2019-02-20 DIAGNOSIS — H50.10 UNSPECIFIED EXOTROPIA: ICD-10-CM

## 2019-02-20 DIAGNOSIS — H43.393 OTHER VITREOUS OPACITIES, BILATERAL: ICD-10-CM

## 2019-02-20 PROCEDURE — 92060 SENSORIMOTOR EXAMINATION: CPT

## 2019-02-20 PROCEDURE — 99204 OFFICE O/P NEW MOD 45 MIN: CPT

## 2019-02-20 PROCEDURE — 92134 CPTRZ OPH DX IMG PST SGM RTA: CPT

## 2019-02-28 ENCOUNTER — APPOINTMENT (OUTPATIENT)
Dept: VASCULAR SURGERY | Facility: CLINIC | Age: 54
End: 2019-02-28

## 2019-03-01 ENCOUNTER — NON-APPOINTMENT (OUTPATIENT)
Age: 54
End: 2019-03-01

## 2019-03-01 ENCOUNTER — APPOINTMENT (OUTPATIENT)
Dept: HEART AND VASCULAR | Facility: CLINIC | Age: 54
End: 2019-03-01
Payer: COMMERCIAL

## 2019-03-01 VITALS
RESPIRATION RATE: 14 BRPM | HEIGHT: 69 IN | HEART RATE: 86 BPM | DIASTOLIC BLOOD PRESSURE: 93 MMHG | SYSTOLIC BLOOD PRESSURE: 136 MMHG

## 2019-03-01 PROCEDURE — 93000 ELECTROCARDIOGRAM COMPLETE: CPT

## 2019-03-01 PROCEDURE — 99214 OFFICE O/P EST MOD 30 MIN: CPT | Mod: 25

## 2019-03-01 RX ORDER — METHYLPREDNISOLONE 4 MG/1
4 TABLET ORAL
Qty: 1 | Refills: 0 | Status: DISCONTINUED | COMMUNITY
Start: 2018-12-19 | End: 2019-03-01

## 2019-03-01 RX ORDER — DIAZEPAM 2 MG/1
2 TABLET ORAL
Qty: 3 | Refills: 0 | Status: DISCONTINUED | COMMUNITY
Start: 2019-02-05 | End: 2019-03-01

## 2019-03-01 NOTE — PHYSICAL EXAM
[General Appearance - Well Developed] : well developed [Normal Appearance] : normal appearance [Well Groomed] : well groomed [General Appearance - Well Nourished] : well nourished [No Deformities] : no deformities [General Appearance - In No Acute Distress] : no acute distress [Normal Conjunctiva] : the conjunctiva exhibited no abnormalities [Eyelids - No Xanthelasma] : the eyelids demonstrated no xanthelasmas [Normal Oral Mucosa] : normal oral mucosa [No Oral Pallor] : no oral pallor [No Oral Cyanosis] : no oral cyanosis [Normal Jugular Venous A Waves Present] : normal jugular venous A waves present [Normal Jugular Venous V Waves Present] : normal jugular venous V waves present [No Jugular Venous Salguero A Waves] : no jugular venous salguero A waves [Respiration, Rhythm And Depth] : normal respiratory rhythm and effort [Exaggerated Use Of Accessory Muscles For Inspiration] : no accessory muscle use [Auscultation Breath Sounds / Voice Sounds] : lungs were clear to auscultation bilaterally [Heart Rate And Rhythm] : heart rate and rhythm were normal [Heart Sounds] : normal S1 and S2 [Murmurs] : no murmurs present [Abdomen Soft] : soft [Abdomen Tenderness] : non-tender [Abdomen Mass (___ Cm)] : no abdominal mass palpated [Nail Clubbing] : no clubbing of the fingernails [Cyanosis, Localized] : no localized cyanosis [Petechial Hemorrhages (___cm)] : no petechial hemorrhages [Skin Color & Pigmentation] : normal skin color and pigmentation [] : no rash [No Venous Stasis] : no venous stasis [Skin Lesions] : no skin lesions [No Skin Ulcers] : no skin ulcer [No Xanthoma] : no  xanthoma was observed [Oriented To Time, Place, And Person] : oriented to person, place, and time [Affect] : the affect was normal [Mood] : the mood was normal [No Anxiety] : not feeling anxious [Arterial Pulses Normal] : the arterial pulses were normal [Edema] : no peripheral edema present

## 2019-03-01 NOTE — REASON FOR VISIT
[Follow-Up - Clinic] : a clinic follow-up of [FreeTextEntry1] : Ms. Treadwell is a 53 y.o. F with pmhx significant for diverticulitis, s/p colon resection, L Lung CA, s/p resection in 2015, Menieres disease, pseudoaneurysms of R ICA with dissection (no sx intervention), HTN, depression and chronic headaches, who presents for palpitations, chest burning, chest discomfort, sob during minimal activity and sometimes at rest. These symptoms occur daily, especially the feelings of tachycardia and sob.  These symptoms began in August 2018, at around the time of the BARON dissection, and have persisted since then.  She has been taking beta blocker with little effect.\par \par She went to [facility] in September 2018 with chest discomfort. She had a nuclear stress test.  \par \par Pt given a LTM by Dr. Leonarda Matthews 10/16/18 - 11/15/2018 showing SR, ST, PVCs, and brief PAT. SOB episodes generally correspond with ST up to 120bpm.  \par \par Pt has been recording her daily max HR based on her Apple watch and notes that max HR is typically 150-200 bpm. She notes that she feels slightly better since her last visit. Continues to c/o intermittent palps, sharp c/p - msk in origin, and dizziness (possible associated with Menieres), denies syncope.  \par \par Nuclear stress test 9/9/18 with EKG showing SR with RBBB, no changes during exercise, normal perfusion, LV EF 77%. Normal SPECT perfusion scans.

## 2019-03-02 ENCOUNTER — TRANSCRIPTION ENCOUNTER (OUTPATIENT)
Age: 54
End: 2019-03-02

## 2019-03-07 ENCOUNTER — APPOINTMENT (OUTPATIENT)
Dept: CT IMAGING | Facility: HOSPITAL | Age: 54
End: 2019-03-07
Payer: COMMERCIAL

## 2019-03-07 ENCOUNTER — OUTPATIENT (OUTPATIENT)
Dept: OUTPATIENT SERVICES | Facility: HOSPITAL | Age: 54
LOS: 1 days | End: 2019-03-07
Payer: COMMERCIAL

## 2019-03-07 DIAGNOSIS — Z90.49 ACQUIRED ABSENCE OF OTHER SPECIFIED PARTS OF DIGESTIVE TRACT: Chronic | ICD-10-CM

## 2019-03-07 DIAGNOSIS — Z90.710 ACQUIRED ABSENCE OF BOTH CERVIX AND UTERUS: Chronic | ICD-10-CM

## 2019-03-07 DIAGNOSIS — Z98.891 HISTORY OF UTERINE SCAR FROM PREVIOUS SURGERY: Chronic | ICD-10-CM

## 2019-03-07 PROCEDURE — 70498 CT ANGIOGRAPHY NECK: CPT | Mod: 26

## 2019-03-07 PROCEDURE — 70496 CT ANGIOGRAPHY HEAD: CPT | Mod: 26

## 2019-03-07 PROCEDURE — 70496 CT ANGIOGRAPHY HEAD: CPT

## 2019-03-07 PROCEDURE — 70498 CT ANGIOGRAPHY NECK: CPT

## 2019-03-21 ENCOUNTER — APPOINTMENT (OUTPATIENT)
Dept: VASCULAR SURGERY | Facility: CLINIC | Age: 54
End: 2019-03-21
Payer: COMMERCIAL

## 2019-03-21 VITALS — HEART RATE: 69 BPM | DIASTOLIC BLOOD PRESSURE: 82 MMHG | OXYGEN SATURATION: 96 % | SYSTOLIC BLOOD PRESSURE: 120 MMHG

## 2019-03-21 PROCEDURE — 99213 OFFICE O/P EST LOW 20 MIN: CPT

## 2019-03-22 NOTE — ASSESSMENT
[FreeTextEntry1] : 53 yo F w/h/o distal BARON dissection, seen only on CTA, returns for reevaluation. She is taking dual platelet therapy, recommend she continue. HA imporved with topamax, nausea better with meclizine. She wants clearance for vestibular PT for the Meneires disease, from a vascular standpoint she can return, however she should also discuss this with Dr. Palomino.

## 2019-03-22 NOTE — PHYSICAL EXAM
[JVD] : no jugular venous distention  [Normal Thyroid] : the thyroid was normal [Carotid Bruits] : no carotid bruits [Respiratory Effort] : normal respiratory effort [Right Carotid Bruit] : no bruit heard over the right carotid [Left Carotid Bruit] : no bruit heard over the left carotid [2+] : left 2+ [No Rash or Lesion] : No rash or lesion [Alert] : alert [Calm] : calm [de-identified] : well appearing, NAD [de-identified] : NC/AT, no evidence of strabismus/nystagmus [de-identified] : FROM throughout, strength 5/5x4 [de-identified] : CNII-XII/CATHIE grossly intact

## 2019-03-22 NOTE — HISTORY OF PRESENT ILLNESS
[FreeTextEntry1] : 52yoF w/suspected PMHx of Meniere's disease who originally presented to [facility] with LUE weakness and headache with subsequent CTA revealing BARON dissection 08/2018. She was discharged home on Xarelto. CTA of the neck was done 11/8/18 showing stable R ICA dissection at craniocervical junction and pseudoaneuysm at the site, US done 11/29/18 did not show carotid dissection. She was advised to discontinue the Xarelto and take Plavix/aspirin which she has been taking since November. \par \par She saw neurologist for her headaches and was started on Topamax which seems to help. Meclizine for the nausea. She sees Dr. Duran for her palpitations, taking beta blocker.

## 2019-03-22 NOTE — DATA REVIEWED
[FreeTextEntry1] : CTA neck reviewed demonstrating stable R ICA dissection at craniocervical junction, +pseudoaneurysms at the site

## 2019-04-24 ENCOUNTER — APPOINTMENT (OUTPATIENT)
Dept: NEUROLOGY | Facility: CLINIC | Age: 54
End: 2019-04-24

## 2019-06-09 PROBLEM — R51 CHRONIC HEADACHE: Status: ACTIVE | Noted: 2018-12-19

## 2019-07-03 ENCOUNTER — APPOINTMENT (OUTPATIENT)
Dept: NEUROLOGY | Facility: CLINIC | Age: 54
End: 2019-07-03
Payer: COMMERCIAL

## 2019-07-03 VITALS
HEART RATE: 101 BPM | WEIGHT: 180 LBS | BODY MASS INDEX: 26.66 KG/M2 | OXYGEN SATURATION: 97 % | HEIGHT: 69 IN | DIASTOLIC BLOOD PRESSURE: 89 MMHG | SYSTOLIC BLOOD PRESSURE: 132 MMHG

## 2019-07-03 DIAGNOSIS — G43.109 MIGRAINE WITH AURA, NOT INTRACTABLE, W/OUT STATUS MIGRAINOSUS: ICD-10-CM

## 2019-07-03 PROCEDURE — 99214 OFFICE O/P EST MOD 30 MIN: CPT

## 2019-07-03 RX ORDER — FOLIC ACID 0.8 MG
500 TABLET ORAL
Qty: 30 | Refills: 6 | Status: ACTIVE | COMMUNITY
Start: 2019-07-03 | End: 1900-01-01

## 2019-07-03 NOTE — PHYSICAL EXAM
[FreeTextEntry1] : Gen- awake, alert, NAD\par MS- oriented x3, able to follow commands, speech fluent and nondysarthric\par CN- PERRL, mild exotropia of R eye with incomplete burying of R eye on L gaze, face symmetrical, few bts horizontal nystag on bilat end gaze, v1-3 intact, face symmetrical, tongue and uvula midline, shoulder shrug intact\par M- R,L (5,5)\par UE: Shoulder abduction (5,5)\par Elbow flexion (5,5)\par Elbow extension (5,5)\par Wrist flexion (5,5)\par Wrist extension (5,5)\par  (5,5)\par No PND\par LE:\par Hip flexion (5,5)\par Knee flexion (5,5)\par Knee extension (5,5)\par Dorsiflexion (5,5)\par Reflexes: R, L (2+, 2+)\par Biceps: (3+, 3+)\par Triceps (3+, 3+)\par BR (3+, 3+)\par Patellars (3+, 3+)\par AJ (3+, 3+)\par Sensory:\par LT, pinprick, proprioception intact bilat\par Coord- FTN intact bilat, finger tapping symmetrical, CATHIE symm, HTS intact\par Gait- narrow based, steady gait, able to tandem, heel and toe walk, sway with rhomberg

## 2019-07-03 NOTE — DISCUSSION/SUMMARY
[FreeTextEntry1] : 53 year old F with hx chronic R ICA dissection, presents for follow up of migraine w aura since 2017, MRI unremarkable and MRa unchanged.  Exam normal aside from brisk reflexes, without overt cord compression on imaging \par \par Episode of LBP radiating into the groin w hematuria c/w kidney stones that likely passed by the time she saw urologist.  As it turns out prior imaging revealed kidney stone which was likely asymptomatic at the time, may have recurred in June in the setting of topamax.\par \par Plan\par Advised to taper of topamax over next 2 weeks \par Gabapentin 300 TID- advised to monitor for weight gain in which we will discontinue it\par Not a great candidate for TCAs given hx POTS, propranolol as he is already on metoprolol.  Cymbalta may be a possibility in the future.  \par If fails gabapentin, will benefit from botulinum toxin inj given she would have failed 2 drugs, contraindicated to have several others\par Mag at bedtime\par lifestyle factors in migraine prevention discussed\par \par F/U 2 mo with Dr. Barragan

## 2019-07-03 NOTE — HISTORY OF PRESENT ILLNESS
[FreeTextEntry1] : Pt presents for follow up of headaches since Aug 2017. Since starting topamax in Feb, has been having approx 4 HA days a month (down from 15 at last visit), until around june 13 when she had a HA that persisted for 2 weeks.\par \par Around June 13th, also developed low back pain, bilateral  that radiated into the groin on the L side.  also blood in the urine.  Was seen by a urologist, who ordered CT abd that was negative for kidney.  But when he looked at prior imaging he said that she did have one.  Back pain resolved by the end of june, around the time she saw a urologist therefore stone may have passed.  Overall back pain has resolved.  . Denies urinary symptoms, dysuria, fevers, or chills.\par \par HA usually comes on during the day but when it comes on at night it keeps her from sleeping.  \par \par Being worked up for Kirk-Danlos symptoms due to spontaneous carotid artery dissection, which she is being followed for by Dr. Palomino.  Also had a tilt table test which was suggestive of POTS.  \par \par Since last visit had MRA and CTA that confirmed pseudoaneurysm is stable.  \par \par Denies recent weight changes, SOB, or weakness. Endorses numbness and tingling at baseline for 1 year mostly on LUE and occasional dizziness requiring her to squat down.\par

## 2019-08-16 ENCOUNTER — APPOINTMENT (OUTPATIENT)
Dept: HEART AND VASCULAR | Facility: CLINIC | Age: 54
End: 2019-08-16
Payer: COMMERCIAL

## 2019-08-16 ENCOUNTER — NON-APPOINTMENT (OUTPATIENT)
Age: 54
End: 2019-08-16

## 2019-08-16 VITALS
HEIGHT: 69 IN | SYSTOLIC BLOOD PRESSURE: 133 MMHG | DIASTOLIC BLOOD PRESSURE: 72 MMHG | WEIGHT: 175 LBS | HEART RATE: 74 BPM | BODY MASS INDEX: 25.92 KG/M2

## 2019-08-16 DIAGNOSIS — R79.89 OTHER SPECIFIED ABNORMAL FINDINGS OF BLOOD CHEMISTRY: ICD-10-CM

## 2019-08-16 PROCEDURE — 93000 ELECTROCARDIOGRAM COMPLETE: CPT

## 2019-08-16 PROCEDURE — 99213 OFFICE O/P EST LOW 20 MIN: CPT

## 2019-09-13 RX ORDER — BUPROPION HYDROCHLORIDE 300 MG/1
300 TABLET, EXTENDED RELEASE ORAL
Refills: 0 | Status: ACTIVE | COMMUNITY

## 2019-09-13 RX ORDER — TOPIRAMATE 25 MG/1
25 TABLET, FILM COATED ORAL TWICE DAILY
Qty: 60 | Refills: 4 | Status: DISCONTINUED | COMMUNITY
Start: 2019-02-05 | End: 2019-09-13

## 2019-09-13 NOTE — REASON FOR VISIT
[Follow-Up - Clinic] : a clinic follow-up of [FreeTextEntry1] : Ms. Treadwell is a 53 y.o. F with PMHx significant for diverticulitis, s/p colon resection, L Lung CA, s/p resection in 2015, Menieres disease, pseudoaneurysms of R ICA with dissection (no sx intervention), HTN, depression and chronic headaches, who has had complaints of palpitation, chest burning, chest discomfort, sob during minimal activity and sometimes at rest. These symptoms occur daily, especially the feelings of tachycardia and sob.  These symptoms began in August 2018, at around the time of the BARON dissection, and have persisted since then.  She has been taking beta blocker with little effect.\par \par She went to [facility] in September 2018 with chest discomfort. She had a nuclear stress test.  \par \par Pt given a LTM by Dr. Leonarda Matthews 10/16/18 - 11/15/2018 showing SR, ST, PVCs, and brief PAT. SOB episodes generally correspond with ST up to 120bpm.  \par \par Nuclear stress test 9/9/18 with EKG showing SR with RBBB, no changes during exercise, normal perfusion, LV EF 77%. Normal SPECT perfusion scans.  \par \par Since our last visit in March 2019, she has generally been feeling better from the arrhythmia perspective though she is still aware of occasional palpitation.  \par \par No syncope.  \par \par

## 2019-10-31 ENCOUNTER — APPOINTMENT (OUTPATIENT)
Dept: VASCULAR SURGERY | Facility: CLINIC | Age: 54
End: 2019-10-31
Payer: COMMERCIAL

## 2019-10-31 ENCOUNTER — APPOINTMENT (OUTPATIENT)
Dept: NEUROLOGY | Facility: CLINIC | Age: 54
End: 2019-10-31
Payer: COMMERCIAL

## 2019-10-31 VITALS
HEIGHT: 69 IN | DIASTOLIC BLOOD PRESSURE: 89 MMHG | OXYGEN SATURATION: 99 % | HEART RATE: 72 BPM | TEMPERATURE: 98.2 F | BODY MASS INDEX: 2.52 KG/M2 | SYSTOLIC BLOOD PRESSURE: 130 MMHG | WEIGHT: 17 LBS

## 2019-10-31 PROCEDURE — 99214 OFFICE O/P EST MOD 30 MIN: CPT

## 2019-10-31 RX ORDER — ONABOTULINUMTOXINA 200 [USP'U]/1
200 INJECTION, POWDER, LYOPHILIZED, FOR SOLUTION INTRADERMAL; INTRAMUSCULAR
Qty: 1 | Refills: 4 | Status: ACTIVE | COMMUNITY
Start: 2019-10-31

## 2019-11-01 ENCOUNTER — APPOINTMENT (OUTPATIENT)
Dept: HEART AND VASCULAR | Facility: CLINIC | Age: 54
End: 2019-11-01
Payer: COMMERCIAL

## 2019-11-01 ENCOUNTER — NON-APPOINTMENT (OUTPATIENT)
Age: 54
End: 2019-11-01

## 2019-11-01 VITALS
SYSTOLIC BLOOD PRESSURE: 106 MMHG | DIASTOLIC BLOOD PRESSURE: 82 MMHG | OXYGEN SATURATION: 97 % | RESPIRATION RATE: 12 BRPM | HEART RATE: 72 BPM

## 2019-11-01 DIAGNOSIS — R79.89 OTHER SPECIFIED ABNORMAL FINDINGS OF BLOOD CHEMISTRY: ICD-10-CM

## 2019-11-01 DIAGNOSIS — R00.2 PALPITATIONS: ICD-10-CM

## 2019-11-01 DIAGNOSIS — R00.0 TACHYCARDIA, UNSPECIFIED: ICD-10-CM

## 2019-11-01 DIAGNOSIS — I45.10 UNSPECIFIED RIGHT BUNDLE-BRANCH BLOCK: ICD-10-CM

## 2019-11-01 PROCEDURE — 93000 ELECTROCARDIOGRAM COMPLETE: CPT

## 2019-11-01 PROCEDURE — 99214 OFFICE O/P EST MOD 30 MIN: CPT

## 2019-11-01 RX ORDER — GABAPENTIN 300 MG/1
300 CAPSULE ORAL TWICE DAILY
Qty: 60 | Refills: 3 | Status: DISCONTINUED | COMMUNITY
Start: 2019-07-03 | End: 2019-11-01

## 2019-11-01 RX ORDER — VERAPAMIL HYDROCHLORIDE 120 MG/1
120 CAPSULE, EXTENDED RELEASE ORAL DAILY
Qty: 30 | Refills: 5 | Status: ACTIVE | COMMUNITY
Start: 2019-10-31

## 2019-11-01 RX ORDER — DOXAZOSIN 1 MG/1
1 TABLET ORAL DAILY
Qty: 30 | Refills: 0 | Status: DISCONTINUED | COMMUNITY
Start: 2019-08-16 | End: 2019-11-01

## 2019-11-01 RX ORDER — CLOPIDOGREL BISULFATE 75 MG/1
75 TABLET, FILM COATED ORAL DAILY
Qty: 90 | Refills: 3 | Status: DISCONTINUED | COMMUNITY
Start: 2018-12-03 | End: 2019-11-01

## 2019-11-01 RX ORDER — METOPROLOL SUCCINATE 50 MG/1
50 TABLET, EXTENDED RELEASE ORAL
Refills: 0 | Status: DISCONTINUED | COMMUNITY
End: 2019-11-01

## 2019-11-01 RX ORDER — ASPIRIN ENTERIC COATED TABLETS 81 MG 81 MG/1
81 TABLET, DELAYED RELEASE ORAL DAILY
Qty: 90 | Refills: 3 | Status: ACTIVE | COMMUNITY
Start: 2018-12-03 | End: 1900-01-01

## 2019-11-01 NOTE — PHYSICAL EXAM
[JVD] : no jugular venous distention  [Normal Thyroid] : the thyroid was normal [Carotid Bruits] : no carotid bruits [Respiratory Effort] : normal respiratory effort [Right Carotid Bruit] : no bruit heard over the right carotid [Left Carotid Bruit] : no bruit heard over the left carotid [2+] : left 2+ [No Rash or Lesion] : No rash or lesion [Alert] : alert [Calm] : calm [de-identified] : well appearing, NAD [de-identified] : NC/AT, no evidence of strabismus/nystagmus [de-identified] : FROM throughout, strength 5/5x4 [de-identified] : CNII-XII/CATHIE grossly intact

## 2019-11-01 NOTE — HISTORY OF PRESENT ILLNESS
[FreeTextEntry1] : 54 yoF w/suspected PMHx of Meniere's disease, 8/2018 presented to [facility] with LUE weakness and headache with subsequent CTA revealing BARON dissection. She was discharged home on Xarelto. CTA of the neck was done 11/8/18 showing stable R ICA dissection at craniocervical junction and pseudoaneuysm at the site, repeat US done 11/29/18 no longer showed carotid dissection. She discontinued the Xarelto and started Plavix/aspirin which she has been taking since November. \par \par She is followed by neurologist who recommended she come here for FU. Her only complaint today is headache, she saw Dr. Barragan this morning who started her on Verapamil and ordered MRA of the neck.

## 2019-11-01 NOTE — ASSESSMENT
[FreeTextEntry1] : 55 yo F w/h/o distal BARON dissection, seen only on CTA, returns for reevaluation. She is taking dual platelet therapy. Pending MRA of the neck that was ordered today by neurologist, will review scan after it is done. Depending on results, will FU PRN.

## 2019-11-01 NOTE — HISTORY OF PRESENT ILLNESS
[FreeTextEntry1] : Ms. Treadwell is a 54 y.o. F with PMHx significant for diverticulitis, s/p colon resection, L Lung CA, s/p resection in 2015, Menieres disease, pseudoaneurysms of R ICA with dissection (no sx intervention), HTN, depression and chronic headaches, who has had complaints of palpitation, chest burning, chest discomfort, sob during minimal activity and sometimes at rest. These symptoms occur daily, especially the feelings of tachycardia and sob.  These symptoms began in August 2018, at around the time of the BARON dissection, and have persisted since then.  She has been taking beta blocker with little effect.  We checked plasma metanephrines in the past and they were mildly elevated - we had prescribed CARDURA and she did not start using that agent as yet.  \par \par Migraines persist and she is starting Verapamil for this.  \par \rodney Also is being evaluated for E[place] \par Since our last visit she has generally been feeling okay from the arrhythmia perspective though she is still aware of occasional palpitation.  \par \par No syncope.

## 2019-11-01 NOTE — PHYSICAL EXAM
[General Appearance - Well Developed] : well developed [Normal Appearance] : normal appearance [Well Groomed] : well groomed [General Appearance - Well Nourished] : well nourished [No Deformities] : no deformities [General Appearance - In No Acute Distress] : no acute distress [Normal Conjunctiva] : the conjunctiva exhibited no abnormalities [Eyelids - No Xanthelasma] : the eyelids demonstrated no xanthelasmas [Normal Oral Mucosa] : normal oral mucosa [No Oral Pallor] : no oral pallor [No Oral Cyanosis] : no oral cyanosis [Normal Jugular Venous A Waves Present] : normal jugular venous A waves present [Normal Jugular Venous V Waves Present] : normal jugular venous V waves present [No Jugular Venous Salguero A Waves] : no jugular venous salguero A waves [Respiration, Rhythm And Depth] : normal respiratory rhythm and effort [Exaggerated Use Of Accessory Muscles For Inspiration] : no accessory muscle use [Auscultation Breath Sounds / Voice Sounds] : lungs were clear to auscultation bilaterally [Heart Rate And Rhythm] : heart rate and rhythm were normal [Heart Sounds] : normal S1 and S2 [Murmurs] : no murmurs present [Arterial Pulses Normal] : the arterial pulses were normal [Edema] : no peripheral edema present [Abdomen Soft] : soft [Abdomen Tenderness] : non-tender [Abdomen Mass (___ Cm)] : no abdominal mass palpated [Nail Clubbing] : no clubbing of the fingernails [Cyanosis, Localized] : no localized cyanosis [Petechial Hemorrhages (___cm)] : no petechial hemorrhages [Skin Color & Pigmentation] : normal skin color and pigmentation [] : no rash [No Venous Stasis] : no venous stasis [Skin Lesions] : no skin lesions [No Skin Ulcers] : no skin ulcer [No Xanthoma] : no  xanthoma was observed [Oriented To Time, Place, And Person] : oriented to person, place, and time [Affect] : the affect was normal [Mood] : the mood was normal [No Anxiety] : not feeling anxious

## 2019-11-04 NOTE — HISTORY OF PRESENT ILLNESS
[FreeTextEntry1] : h/o carotid artery dissection. Carotid artery improved  - now only on aspirin. \par Her headaches stated in 2017 and carotid was august 2018. \par headaches are worse off topiramate\par and topiramate stopped for kidney [id number] days of headache in the last month

## 2019-11-21 ENCOUNTER — APPOINTMENT (OUTPATIENT)
Dept: PEDIATRIC MEDICAL GENETICS | Facility: CLINIC | Age: 54
End: 2019-11-21
Payer: COMMERCIAL

## 2019-11-21 VITALS
SYSTOLIC BLOOD PRESSURE: 110 MMHG | WEIGHT: 180 LBS | BODY MASS INDEX: 26.66 KG/M2 | HEIGHT: 69 IN | DIASTOLIC BLOOD PRESSURE: 72 MMHG

## 2019-11-21 DIAGNOSIS — I95.1 TACHYCARDIA, UNSPECIFIED: ICD-10-CM

## 2019-11-21 DIAGNOSIS — R10.9 UNSPECIFIED ABDOMINAL PAIN: ICD-10-CM

## 2019-11-21 DIAGNOSIS — G89.29 UNSPECIFIED ABDOMINAL PAIN: ICD-10-CM

## 2019-11-21 DIAGNOSIS — R00.0 TACHYCARDIA, UNSPECIFIED: ICD-10-CM

## 2019-11-21 PROCEDURE — 99205 OFFICE O/P NEW HI 60 MIN: CPT

## 2019-11-21 RX ORDER — DOXAZOSIN MESYLATE 4 MG/1
4 TABLET, FILM COATED, EXTENDED RELEASE ORAL DAILY
Qty: 30 | Refills: 0 | Status: DISCONTINUED | COMMUNITY
Start: 2019-08-16 | End: 2019-11-21

## 2019-11-27 NOTE — BIRTH HISTORY
[FreeTextEntry1] : The patient reports that she was born via , with no delivery complications,  birth defects, congenital dislocations or early developmental delays.

## 2019-11-27 NOTE — PHYSICAL EXAM
[Normal] : without joint laxity or contractures [Fine Motor Coordination] : fine motor coordination is normal [] : Yes [Total Score ___] : Total Score = [unfilled] [de-identified] : skin is mildly extensible, normal skin texture, no striae, well healed scars on abdomen, no abnormal scars, no prematurely aged appearance/paucity of SQ fat/prominent vascular markings  [de-identified] : HC 59.5 cm; macrodolichocephaly  [de-identified] : normal sclerae, no corneal clouding [de-identified] : no pits, tags, or creases [de-identified] : normal philtrum, normal lips, normal palate, normal uvula, mild malar flattening. Dentures and implants in place [de-identified] : no arachnodactyly, no piezogenic papules [de-identified] : normal extensibility of fingers, increased thoracic kyphosis [de-identified] : no gross focal deficits [de-identified] : negative wrist and thumb sign; arm lcwk=778.5 cm [Left] : Left: N [Right] : Right: N

## 2019-11-27 NOTE — FAMILY HISTORY
[FreeTextEntry1] : Ms. Sammy Bush is of [place] descent. Consanguinity is denied. Family history was not significant for any individuals diagnosed with a heritable disorder of connective tissue. The patient has 3 healthy daughers. The patient's mother had recurrent shoulder dislocations, associated with trauma. Patient's mother also had epilepsy. She  of leukemia at age 82.  The patient's father  of lung cancer. She has a sister with amblyopia and several siblings with a history of colon polyps. She has a maternal aunt who  of uterine cancer. She had another maternal aunt who had an intracranial bleed, etiology unknown. The patient's family history is not significant for birth defects, cognitive disabilities, autism, seizures, musculoskeletal conditions, bleeding conditions, or multiple miscarriages.\par

## 2019-11-27 NOTE — REASON FOR VISIT
[Initial - Scheduled] : [unfilled]  is being seen for  ~M an initial scheduled visit [FreeTextEntry3] : The patient is a 54 year old woman referred by Dr. Ean Garrison for this initial genetic consultation to evaluate for a heritable disorder of connective tissue. Genetic counselor, Pili Adams, was present for the evaluation. \par \par

## 2019-11-27 NOTE — HISTORY OF PRESENT ILLNESS
[FreeTextEntry1] : Ms. Sammy Bush is being seen for an initial evaluation and presents with a complicated past medical history. The patient has been out of work for the past 1.5 years due to a variety of debilitating medical problems. Her main complaints include the following systems: gastrointestinal, neurological, cardiac, and ears. She has had abdominal pain and gastrointestinal issues since childhood. She has had many occurrences of diverticulitis and is s/p colon resection for diverticulitis in June 2014. She had a recurrence this year. Ms. Sammy Bush She has a diagnosis of Meniere's disease. She denies neuropathic pain. She has never had an EMG. She reports severe sacroiliac pain after delivering her first child. The patient also has a history of lung cancer s/p resection, diagnosed in 2015. \par \par She was evaluated by a , Dr. Jackson, in [place] 6 months ago and given a clinical diagnosis of Hypermobility Spectrum Disorder.  She had [facility] TAAD panel testing through Dr. Garrison as well.\par She is planning to follow up with Dr. Garrison, her interventional cardiologist, and her gastroenterologist.

## 2020-04-03 ENCOUNTER — APPOINTMENT (OUTPATIENT)
Dept: HEART AND VASCULAR | Facility: CLINIC | Age: 55
End: 2020-04-03

## 2020-04-25 ENCOUNTER — MESSAGE (OUTPATIENT)
Age: 55
End: 2020-04-25

## 2020-07-29 ENCOUNTER — OUTPATIENT (OUTPATIENT)
Dept: OUTPATIENT SERVICES | Facility: HOSPITAL | Age: 55
LOS: 1 days | Discharge: HOME | End: 2020-07-29
Payer: COMMERCIAL

## 2020-07-29 ENCOUNTER — RESULT REVIEW (OUTPATIENT)
Age: 55
End: 2020-07-29

## 2020-07-29 DIAGNOSIS — Z90.49 ACQUIRED ABSENCE OF OTHER SPECIFIED PARTS OF DIGESTIVE TRACT: Chronic | ICD-10-CM

## 2020-07-29 DIAGNOSIS — Z90.710 ACQUIRED ABSENCE OF BOTH CERVIX AND UTERUS: Chronic | ICD-10-CM

## 2020-07-29 DIAGNOSIS — Z98.891 HISTORY OF UTERINE SCAR FROM PREVIOUS SURGERY: Chronic | ICD-10-CM

## 2020-07-29 PROCEDURE — 88305 TISSUE EXAM BY PATHOLOGIST: CPT

## 2020-07-29 PROCEDURE — 43239 EGD BIOPSY SINGLE/MULTIPLE: CPT

## 2020-07-29 PROCEDURE — 88305 TISSUE EXAM BY PATHOLOGIST: CPT | Mod: 26

## 2020-07-29 PROCEDURE — G0105: CPT

## 2020-07-31 LAB — SURGICAL PATHOLOGY STUDY: SIGNIFICANT CHANGE UP

## 2020-12-01 ENCOUNTER — APPOINTMENT (OUTPATIENT)
Dept: PAIN MANAGEMENT | Facility: CLINIC | Age: 55
End: 2020-12-01
Payer: COMMERCIAL

## 2020-12-01 VITALS
WEIGHT: 180 LBS | SYSTOLIC BLOOD PRESSURE: 122 MMHG | BODY MASS INDEX: 26.66 KG/M2 | DIASTOLIC BLOOD PRESSURE: 80 MMHG | TEMPERATURE: 98 F | HEIGHT: 69 IN

## 2020-12-01 DIAGNOSIS — M79.18 MYALGIA, OTHER SITE: ICD-10-CM

## 2020-12-01 DIAGNOSIS — M25.551 PAIN IN RIGHT HIP: ICD-10-CM

## 2020-12-01 DIAGNOSIS — M47.817 SPONDYLOSIS W/OUT MYELOPATHY OR RADICULOPATHY, LUMBOSACRAL REGION: ICD-10-CM

## 2020-12-01 DIAGNOSIS — G89.4 CHRONIC PAIN SYNDROME: ICD-10-CM

## 2020-12-01 PROCEDURE — 99204 OFFICE O/P NEW MOD 45 MIN: CPT

## 2020-12-01 PROCEDURE — 99072 ADDL SUPL MATRL&STAF TM PHE: CPT

## 2020-12-01 NOTE — REVIEW OF SYSTEMS
[Abdominal Pain] : abdominal pain [Joint Pain] : joint pain [Joint Stiffness] : joint stiffness [Headache] : headache [Dizziness] : dizziness [Depression] : depression [Easy Bleeding] : a tendency for easy bleeding [Easy Bruising] : a tendency for easy bruising [Negative] : Endocrine [FreeTextEntry2] : fatigue [FreeTextEntry5] : chest pain,sob,palpitations [FreeTextEntry9] : swelling

## 2020-12-01 NOTE — HISTORY OF PRESENT ILLNESS
[___ mths] : [unfilled] month(s) ago [Constant] : constant [4] : a current pain level of 4/10 [5] : an average pain level of 5/10 [10] : a maximum pain level of 10/10 [Sharp] : sharp [Shooting] : shooting [Sitting] : sitting [Standing] : standing [FreeTextEntry1] : HPI\par \par Ms. HEBER MONROY is a 55 year F with pmhx of cardiomyopathy with PPM/AICD followed by Dr. Acevedo, left lung ca s/p wedge resection followed by Dr. Rosales.     Recently diagnosed EDS followed Dr. Jiménez, sp Left meniscus repair.  Presents right buttock, hip groin pain worse with weight bearing, turning in bed.  Pain is so bad that patient finds it difficult to perform adls and ambulate. denies any worsening numbness, weakness, bowel/bladder dysfunction\par \par Previous and current pain medications/doses/effects:\par \par Tylenol with mild improvement\par \par Previous Pain Treatments:\par \par PT with mild improvement\par \par Previous Pain Injections:\par \par Previous Diagnostic Studies/Images:\par \par XR LS 11/2020\par \par L5-S1 mild degenerative disc disease\par  [FreeTextEntry7] : SI joint pain  [de-identified] : gripping [FreeTextEntry3] : turning [FreeTextEntry4] : n/a

## 2020-12-01 NOTE — PHYSICAL EXAM
[Facet Tenderness] : facet tenderness [Spine: Flexion to ___ degrees, without pain] : spine: flexion to [unfilled] degrees, without pain [Antalgic] : antalgic [] : Motor: [NL] : normal and symmetric bilaterally [de-identified] : Constitutional: Normal, well developed, no acute distress\par Eyes: Symmetric, External structures \par Oropharynx: Lips normal, symmetric, no external lesions appreciated\par Respiratory: Non-labored breathing, no audible wheezes\par Cardiac: Pulse palpated, no tachycardia\par Vascular: No cyanosis appreciated, no edema in bilateral lower extremities\par GI: Nondistended, no jaundice appreciated\par Neurovascular: CN2-12 grossly intact, Alert and oriented\par MSK: Normal muscle bulk, 5/5 Motor strength B/L in LE\par \par  [de-identified] : right hip pain with internal rotation

## 2020-12-01 NOTE — ASSESSMENT
[FreeTextEntry1] : back and leg pain likely secondary to lumbar radiculopathy and discogenic pain refractory to conservative treatments including 6 consecutive weeks of home exercises/PT, will obtain CT LS to evaluate for pathology\par (PPM)\par \par may consider PT vs intervention pending eval\par \par continue PT for right hip pain - likely dysfunction secondary to EDS\par \par will avoid steroid based treatments\par \par trial gabapentin uptitrate to TID\par cautioned change in mood.  Encouraged to call with any worsening mood or depression/suicidal ideations\par \par \par The above diagnosis and treatment plan is medically reasonable and necessary based on the patient encounter.\par \par There were no barriers to communication.\par Informed patient that I would be available for any additional questions.\par Patient was instructed to call with any worsening symptoms including severe pain, new numbness/weakness, or changes in the bowel/bladder function. \par \par  \par Discussed role of nsaids in pain management and all relevant risks, if patient is continuing to require after 4 weeks the patient should f/u for alternative treatment. \par \par Instructed patient to maintain pain diary to monitor pain level, mobility, and function.\par \par The referring provider was informed of the above diagnosis and treatment plan.\par

## 2021-07-14 NOTE — ASSESSMENT
[FreeTextEntry1] : will need an MRA of the neck to look at dissection. \par continue work up for ED. 
None

## 2021-12-22 ENCOUNTER — APPOINTMENT (OUTPATIENT)
Dept: CARDIOTHORACIC SURGERY | Facility: CLINIC | Age: 56
End: 2021-12-22
Payer: MEDICARE

## 2021-12-22 DIAGNOSIS — Z86.79 PERSONAL HISTORY OF OTHER DISEASES OF THE CIRCULATORY SYSTEM: ICD-10-CM

## 2021-12-22 PROCEDURE — 99204 OFFICE O/P NEW MOD 45 MIN: CPT | Mod: 95

## 2021-12-23 PROBLEM — Z86.79 HISTORY OF CARDIOMYOPATHY: Status: RESOLVED | Noted: 2021-12-23 | Resolved: 2021-12-23

## 2022-08-15 ENCOUNTER — OUTPATIENT (OUTPATIENT)
Dept: OUTPATIENT SERVICES | Facility: HOSPITAL | Age: 57
LOS: 1 days | Discharge: HOME | End: 2022-08-15
Payer: MEDICARE

## 2022-08-15 ENCOUNTER — TRANSCRIPTION ENCOUNTER (OUTPATIENT)
Age: 57
End: 2022-08-15

## 2022-08-15 ENCOUNTER — RESULT REVIEW (OUTPATIENT)
Age: 57
End: 2022-08-15

## 2022-08-15 DIAGNOSIS — Z98.891 HISTORY OF UTERINE SCAR FROM PREVIOUS SURGERY: Chronic | ICD-10-CM

## 2022-08-15 DIAGNOSIS — Z90.49 ACQUIRED ABSENCE OF OTHER SPECIFIED PARTS OF DIGESTIVE TRACT: Chronic | ICD-10-CM

## 2022-08-15 DIAGNOSIS — Z90.710 ACQUIRED ABSENCE OF BOTH CERVIX AND UTERUS: Chronic | ICD-10-CM

## 2022-08-15 PROCEDURE — 43239 EGD BIOPSY SINGLE/MULTIPLE: CPT

## 2022-08-15 PROCEDURE — 88342 IMHCHEM/IMCYTCHM 1ST ANTB: CPT | Mod: 26

## 2022-08-15 PROCEDURE — 88305 TISSUE EXAM BY PATHOLOGIST: CPT

## 2022-08-15 PROCEDURE — 88305 TISSUE EXAM BY PATHOLOGIST: CPT | Mod: 26

## 2022-08-15 PROCEDURE — 88341 IMHCHEM/IMCYTCHM EA ADD ANTB: CPT

## 2022-08-16 LAB — SURGICAL PATHOLOGY STUDY: SIGNIFICANT CHANGE UP

## 2022-12-07 NOTE — H&P ADULT - PSH
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 Ward Isabel) and full thickness skin graft from left groin to left foot, local tissue rearrangement on 11/22/2022. She denies fever and chills. She denies nausea, vomiting, diarrhea or constipation. Her pain is controlled. Physical Exam     Surgical incisions are clean, dry, and intact. No erythema, no wound drainage. Left foot skin graft with adherent dermis and coverage of tendon. The epidermis appears to have sloughed off. There are several small skin islands in the graft. No erythema or wound drainage. No swelling. There were no vitals filed for this visit. 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 Ward Isabel) and full thickness skin graft from left groin to left foot, local tissue rearrangement on 11/22/2022    We discussed that this area can take some time to completely heal.  I recommend  she continue with topical wound care. Mupirocin ointment was sent to her pharmacy. She should apply this twice daily followed by Xeroform, gauze and a minimal wrap so she can fit into her sock and shoe as long as it does not rub on the graft. She should continue to not get this area wet. I recommend she continue with activity restrictions and minimal foot movement. We reviewed work restrictions as well. She will follow-up in approximately 2 weeks with our PA for wound check and only 4 weeks with me for repeat wound check. Questions were answered. Patient understands.
H/O:   x2  H/O: hysterectomy  2015  Other postprocedural status  x2  Other postprocedural status  History of bladder surgery  S/P colon resection  2014 by Dr. Bullard  Status post tubal ligation  History of tubal ligation

## 2022-12-26 ENCOUNTER — NON-APPOINTMENT (OUTPATIENT)
Age: 57
End: 2022-12-26

## 2023-01-13 NOTE — DATA REVIEWED
[FreeTextEntry1] :  CTA 11/8/2018 demonstrated a pseudoaneurysm measuring 9mm x 1.8mm. stable dissection with pseudoaneurysm involving the right internal carotid artery at the craniocervical junction. 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

## 2023-01-13 NOTE — PROCEDURE
[FreeTextEntry1] : Dr. Patton reviewed the indications for surgery, and used our webpage [email or web address] <[email or web address]> to illustrate the aorta and anatomy of the heart. Those indications are the following: size greater than 5.0 cm, symptomatic aneurysms, family history of aortic dissection or aneurysm death with a size greater than 4.5 cm, other necessary cardiac procedures such as coronary artery bypass grafting or valvular disorders with an aneurysm greater than 4.5 cm, or connective tissue disorders with an aneurysm size greater than 4.5 cm. The patient does not meet size criteria for surgical intervention at the time.\par \par Dr. Patton discussed activity restrictions with the patient, and would advise exercise at a moderate amount with no heavy lifting over one third of body weight, and avoiding heart rates that exceed 140 beats per minute. In addition, every patient should abstain from tobacco abuse and to avoid all illicit drug use, especially stimulants such as cocaine or methamphetamine. Dr. Patton also counseled regarding maintaining a healthy heart diet, and losing any excessive weight as this also put undue stress on both the aorta and entire cardiovascular system. First degree family members should be screened for bicuspid valve disease, and ascending aortic aneurysms. \par \par Patient was advised to view the educational video prior to this visit regarding aortic pathology, risk factors, surgical procedures, and lifestyle modifications. Video can be retrieved at [email or web address].\par

## 2023-01-13 NOTE — ASSESSMENT
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 Patient is referred by Dr. Charisma Goodman from the EDS/hypermobility spectrum disorder center at [facility]. \par \par \par I have reviewed the patient's medical records, diagnostic images during the time of this office consultation and have made the following recommendation. Review of the imaging shows her aortic pathology has remained stable and does not require surgical intervention.\par \par  \par 1. [facility] up in [facility] ___ with ____.\par 2. Continue medication regimen.\par 3. Follow up with cardiologist and PCP.\par 4. Blood pressure management.\par

## 2023-01-13 NOTE — HISTORY OF PRESENT ILLNESS
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 Patient is referred by Dr. Charisma Goodman from the EDS/hypermobility spectrum disorder center at [facility]. \par \par CTA head/neck \par \par ??recent cardiac workup? \par

## 2023-01-18 ENCOUNTER — APPOINTMENT (OUTPATIENT)
Dept: CARDIOTHORACIC SURGERY | Facility: CLINIC | Age: 58
End: 2023-01-18
Payer: MEDICARE

## 2023-02-15 ENCOUNTER — APPOINTMENT (OUTPATIENT)
Dept: CARDIOTHORACIC SURGERY | Facility: CLINIC | Age: 58
End: 2023-02-15
Payer: MEDICARE

## 2023-02-15 ENCOUNTER — NON-APPOINTMENT (OUTPATIENT)
Age: 58
End: 2023-02-15

## 2023-02-15 PROCEDURE — 99214 OFFICE O/P EST MOD 30 MIN: CPT | Mod: 95

## 2023-02-15 RX ORDER — METOPROLOL SUCCINATE 50 MG/1
50 TABLET, EXTENDED RELEASE ORAL
Refills: 0 | Status: ACTIVE | COMMUNITY

## 2023-02-15 RX ORDER — DULOXETINE HYDROCHLORIDE 20 MG/1
20 CAPSULE, DELAYED RELEASE PELLETS ORAL
Refills: 0 | Status: ACTIVE | COMMUNITY

## 2023-02-18 NOTE — END OF VISIT
[Time Spent: ___ minutes] : I have spent [unfilled] minutes of time on the encounter. [FreeTextEntry3] : I personally performed the services described in the documentation, reviewed the documentation recorded by the scribe in my presence and it accurately and completely records my words and actions.\par

## 2023-02-18 NOTE — DATA REVIEWED
[FreeTextEntry1] :  CTA 11/8/2018 demonstrated a pseudoaneurysm measuring 9mm x 1.8mm. stable dissection with pseudoaneurysm involving the right internal carotid artery at the craniocervical junction. 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 unchanged part solid nodules. 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 unchanged part solid nodules. 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

## 2023-02-18 NOTE — HISTORY OF PRESENT ILLNESS
[FreeTextEntry1] : This visit was provided via telehealth using real-time 2-way audio visual technology. The patient, HEBER MONROY, was located at home, [address] [place] [address] , at the time of the visit. The provider was located at [address] [place] [address]. The patient, HEBER MONROY, Dr. Christos Patton and MARILOU MEDINA all participated in the telehealth encounter. Verbal consent given on 02/15/2023 by HEBER ELIANA. 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 Patient is referred by Dr. Charisma Goodman from the EDS/hypermobility spectrum disorder center at [facility]. \par \par Patient seen today via telehealth. Patient reports feeling well with no recent surgeries, hospitalizations. Follows regularly with cardiologist. \par \par

## 2023-02-18 NOTE — PROCEDURE
[FreeTextEntry1] : Dr. Patton reviewed the indications for surgery, and used our webpage [email or web address].org <[email or web address].org> to illustrate the aorta and anatomy of the heart. Those indications are the following: size greater than 5.0 cm, symptomatic aneurysms, family history of aortic dissection or aneurysm death with a size greater than 4.5 cm, other necessary cardiac procedures such as coronary artery bypass grafting or valvular disorders with an aneurysm greater than 4.5 cm, or connective tissue disorders with an aneurysm size greater than 4.5 cm. The patient does not meet size criteria for surgical intervention at the time.\par \par Dr. Patton discussed activity restrictions with the patient, and would advise exercise at a moderate amount with no heavy lifting over one third of body weight, and avoiding heart rates that exceed 140 beats per minute. In addition, every patient should abstain from tobacco abuse and to avoid all illicit drug use, especially stimulants such as cocaine or methamphetamine. Dr. Patton also counseled regarding maintaining a healthy heart diet, and losing any excessive weight as this also put undue stress on both the aorta and entire cardiovascular system. First degree family members should be screened for bicuspid valve disease, and ascending aortic aneurysms. \par \par Patient was advised to view the educational video prior to this visit regarding aortic pathology, risk factors, surgical procedures, and lifestyle modifications. Video can be retrieved at [email or web address].com/watch?[email or web address]=youtu.be.\par

## 2023-02-18 NOTE — ASSESSMENT
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 Patient is referred by Dr. Charisma Goodman from the EDS/hypermobility spectrum disorder center at [facility]. \par \par I have reviewed the patient's medical records, diagnostic images during the time of this office consultation and have made the following recommendation. Review of the imaging shows her aortic pathology has remained stable and does not require surgical intervention.\par \par  \par 1. [facility] in [facility] 1 year with a repeat CTA. \par 2. Continue medication regimen.\par 3. Follow up with cardiologist Charisma Davis, and PCP.\par 4. Blood pressure management.\par

## 2023-12-04 ENCOUNTER — TRANSCRIPTION ENCOUNTER (OUTPATIENT)
Age: 58
End: 2023-12-04

## 2024-02-23 ENCOUNTER — NON-APPOINTMENT (OUTPATIENT)
Age: 59
End: 2024-02-23

## 2024-02-28 ENCOUNTER — APPOINTMENT (OUTPATIENT)
Dept: CARDIOTHORACIC SURGERY | Facility: CLINIC | Age: 59
End: 2024-02-28
Payer: MEDICARE

## 2024-05-01 ENCOUNTER — APPOINTMENT (OUTPATIENT)
Dept: CARDIOTHORACIC SURGERY | Facility: CLINIC | Age: 59
End: 2024-05-01
Payer: MEDICARE

## 2024-05-01 ENCOUNTER — APPOINTMENT (OUTPATIENT)
Dept: CARDIOTHORACIC SURGERY | Facility: CLINIC | Age: 59
End: 2024-05-01
Payer: COMMERCIAL

## 2024-05-01 DIAGNOSIS — I77.71 DISSECTION OF CAROTID ARTERY: ICD-10-CM

## 2024-05-01 DIAGNOSIS — Q79.60 EHLERS-DANLOS SYNDROME, UNSP: ICD-10-CM

## 2024-05-01 DIAGNOSIS — I72.0 ANEURYSM OF CAROTID ARTERY: ICD-10-CM

## 2024-05-01 PROCEDURE — 99212 OFFICE O/P EST SF 10 MIN: CPT

## 2024-05-02 PROBLEM — I72.0 PSEUDOANEURYSM OF CAROTID ARTERY: Status: ACTIVE | Noted: 2021-12-23

## 2024-05-02 PROBLEM — I77.71 CAROTID ARTERY DISSECTION: Status: ACTIVE | Noted: 2018-08-30

## 2024-05-02 PROBLEM — Q79.60 EDS (EHLERS-DANLOS SYNDROME): Status: ACTIVE | Noted: 2020-12-01

## 2024-05-02 NOTE — REASON FOR VISIT
[Home] : at home, [unfilled] , at the time of the visit. [Medical Office: ([facility])___] : at the medical office located in  [Patient] : the patient [Self] : self

## 2024-05-12 NOTE — HISTORY OF PRESENT ILLNESS
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 Patient is referred by Dr. Charisma Goodman from the EDS/hypermobility spectrum disorder center at [facility].  CTA chest 4/16/24: No aortic dissection or dilatation. Patent brachiocephalic, left common carotid and left subclavian arteries.  Clustered subcentimeter nodules in the posteriomedial RLL, probable inflammatory/infectious.  No sign change in multiple B/L solid and subsolid pulmonary opacities.   CTA head and neck 4/24/24: Unchanged distal R ICA posteriorly and laterally directed saccular outpouching measuring up to 0.8cm on axial images. Appearance compatible with pseudoaneurysm as sequela of dissection as before.   Pt reports 3 ED visits in the past several months with very elevated BP's as high as 240/180. Her cardiologist is adjusting her medications, BP currently stable. She denies fever, chills, fatigue, headache, blurred vision, dizziness, syncope, chest pain, palpitations, shortness of breath, orthopnea, paroxysmal nocturnal dyspnea, nausea, vomiting, abdominal pain, back pain, BRBPR or swelling to legs.

## 2024-05-12 NOTE — CONSULT LETTER
[Dear  ___] : Dear  [unfilled], [Please see my note below.] : Please see my note below. [Sincerely,] : Sincerely, [FreeTextEntry2] : Moy Mabry MD T ;[phone] F: [phone] [FreeTextEntry1] : Please find attached our consultation on your patient, Ms. HEBER MONROY .   We take a multidisciplinary team approach to patient care and consider you, the referring physician, an extension of our team. We will maintain an open line of communication with you throughout your patient's treatment course.    It is our commitment to provide your patient with the highest quality of advanced therapeutic options. We thank you for allowing us to participate in the care of your patient.   Please do not hesitate to contact our team with any questions or concerns at [phone]. [FreeTextEntry3] : Christos Patton M.D. Professor of Cardiovascular and Thoracic Surgery Minimally Invasive Valve Surgeon Director of Aortic Surgery, [facility] Cell: [phone] Email: [email or web address]: [address], [address], [place] [address] Office: [phone] Fax: [phone]   [facility]: Department of Cardiovascular and Thoracic Surgery [address] [place] [address], [facility] Office: [phone] Fax: [phone]   Practice Manager: Ms. Jazz Hutton Email: [email or web address] Phone: [phone]

## 2024-05-12 NOTE — ASSESSMENT
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   - [facility] in [facility] 2 yr with CTA head, neck and chest.  - Continue medication regimen. - Follow up with cardiologist and PCP. - Blood pressure management. - Discussed signs and symptoms that warrant emergency medical attention.

## 2024-05-12 NOTE — END OF VISIT
[Time Spent: ___ minutes] : I have spent [unfilled] minutes of time on the encounter. [FreeTextEntry3] : I, DANIEL Mendez personally performed the evaluation and management (E/M) services for this established patient who presents today with (a) new problem(s)/exacerbation of (an) existing condition(s).  That E/M includes conducting the clinically appropriate interval history &/or exam, assessing all new/exacerbated conditions, and establishing a new plan of care.  Today, my SHEILA, was here to observe &/or participate in the visit & follow plan of care established by me.

## 2024-05-12 NOTE — DATA REVIEWED
[FreeTextEntry1] : CTA 11/8/2018 demonstrated a pseudoaneurysm measuring 9mm x 1.8mm. stable dissection with pseudoaneurysm involving the right internal carotid artery at the craniocervical junction.  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    CTA chest, head and neck 2/8/23  normal course caliber of the thoracic aorta and its major branching arteries. unchanged part solid nodules.  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 unchanged part solid nodules.  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

## (undated) DEVICE — FORCEP RADIAL JAW 4 W NDL 2.2MM 2.8MM 240CM ORANGE DISP